# Patient Record
Sex: FEMALE | Race: BLACK OR AFRICAN AMERICAN | NOT HISPANIC OR LATINO | URBAN - METROPOLITAN AREA
[De-identification: names, ages, dates, MRNs, and addresses within clinical notes are randomized per-mention and may not be internally consistent; named-entity substitution may affect disease eponyms.]

---

## 2023-04-20 PROBLEM — R10.9 RIGHT SIDED ABDOMINAL PAIN: Status: ACTIVE | Noted: 2023-04-20

## 2023-04-20 PROBLEM — K58.9 IRRITABLE BOWEL: Status: ACTIVE | Noted: 2023-04-20

## 2023-04-20 PROBLEM — R22.1 NECK FULLNESS: Status: ACTIVE | Noted: 2023-04-20

## 2023-04-20 PROBLEM — R92.30 DENSE BREASTS: Status: ACTIVE | Noted: 2023-04-20

## 2023-04-20 PROBLEM — M25.519 SHOULDER PAIN: Status: ACTIVE | Noted: 2023-04-20

## 2023-04-20 PROBLEM — R19.5 LOOSE STOOLS: Status: ACTIVE | Noted: 2023-04-20

## 2023-04-20 PROBLEM — R92.2 DENSE BREASTS: Status: ACTIVE | Noted: 2023-04-20

## 2023-04-20 PROBLEM — J20.9 ACUTE BRONCHITIS: Status: ACTIVE | Noted: 2023-04-20

## 2023-04-20 PROBLEM — K80.20 CHOLELITHIASIS: Status: ACTIVE | Noted: 2023-04-20

## 2023-04-20 PROBLEM — E78.5 HYPERLIPIDEMIA: Status: ACTIVE | Noted: 2023-04-20

## 2023-04-20 PROBLEM — E04.2 MULTIPLE THYROID NODULES: Status: ACTIVE | Noted: 2023-04-20

## 2023-04-20 PROBLEM — R40.0 DAYTIME SLEEPINESS: Status: ACTIVE | Noted: 2023-04-20

## 2023-04-20 PROBLEM — R91.8 PULMONARY NODULES: Status: ACTIVE | Noted: 2023-04-20

## 2023-04-20 PROBLEM — R10.84 GENERALIZED ABDOMINAL PAIN: Status: ACTIVE | Noted: 2023-04-20

## 2023-04-20 PROBLEM — E11.9 DIABETES MELLITUS, TYPE 2 (MULTI): Status: ACTIVE | Noted: 2023-04-20

## 2023-04-20 PROBLEM — R11.0 MILD NAUSEA: Status: ACTIVE | Noted: 2023-04-20

## 2023-04-20 PROBLEM — N64.4 BREAST PAIN: Status: ACTIVE | Noted: 2023-04-20

## 2023-04-20 PROBLEM — M54.2 NECK PAIN ON RIGHT SIDE: Status: ACTIVE | Noted: 2023-04-20

## 2023-04-20 PROBLEM — E11.40 DIABETIC NEUROPATHY, PAINFUL (MULTI): Status: ACTIVE | Noted: 2023-04-20

## 2023-04-20 PROBLEM — M54.9 RIGHT-SIDED BACK PAIN: Status: ACTIVE | Noted: 2023-04-20

## 2023-04-20 PROBLEM — M19.011 OSTEOARTHRITIS OF SHOULDERS, BILATERAL: Status: ACTIVE | Noted: 2023-04-20

## 2023-04-20 PROBLEM — E55.9 VITAMIN D DEFICIENCY: Status: ACTIVE | Noted: 2023-04-20

## 2023-04-20 PROBLEM — R52 BODY ACHES: Status: ACTIVE | Noted: 2023-04-20

## 2023-04-20 PROBLEM — R53.83 FATIGUE: Status: ACTIVE | Noted: 2023-04-20

## 2023-04-20 PROBLEM — R10.13 EPIGASTRIC PAIN: Status: ACTIVE | Noted: 2023-04-20

## 2023-04-20 PROBLEM — D12.6 ADENOMATOUS COLON POLYP: Status: ACTIVE | Noted: 2023-04-20

## 2023-04-20 PROBLEM — K59.00 CONSTIPATION: Status: ACTIVE | Noted: 2023-04-20

## 2023-04-20 PROBLEM — M54.12 CERVICAL RADICULAR PAIN: Status: ACTIVE | Noted: 2023-04-20
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
## 2023-04-20 PROBLEM — M19.012 OSTEOARTHRITIS OF SHOULDERS, BILATERAL: Status: ACTIVE | Noted: 2023-04-20

## 2023-04-20 RX ORDER — INSULIN LISPRO 100 [IU]/ML
INJECTION, SOLUTION INTRAVENOUS; SUBCUTANEOUS
COMMUNITY
Start: 2021-07-09 | End: 2024-01-25 | Stop reason: SDUPTHER

## 2023-04-20 RX ORDER — HYOSCYAMINE SULFATE 0.12 MG/1
TABLET, ORALLY DISINTEGRATING ORAL
COMMUNITY
Start: 2022-03-28 | End: 2024-02-14 | Stop reason: WASHOUT

## 2023-04-20 RX ORDER — FLASH GLUCOSE SENSOR
KIT MISCELLANEOUS
COMMUNITY
Start: 2023-01-30 | End: 2024-01-25 | Stop reason: SDUPTHER

## 2023-04-20 RX ORDER — DEXTROMETHORPHAN HYDROBROMIDE, GUAIFENESIN 5; 100 MG/5ML; MG/5ML
LIQUID ORAL
COMMUNITY
End: 2024-02-14 | Stop reason: SDUPTHER

## 2023-04-20 RX ORDER — INSULIN DEGLUDEC 100 U/ML
INJECTION, SOLUTION SUBCUTANEOUS
COMMUNITY
Start: 2018-07-31 | End: 2024-01-25 | Stop reason: SDUPTHER

## 2023-04-20 RX ORDER — BLOOD-GLUCOSE METER
KIT MISCELLANEOUS
COMMUNITY
Start: 2018-02-08 | End: 2023-09-14 | Stop reason: ALTCHOICE

## 2023-04-20 RX ORDER — ERGOCALCIFEROL 1.25 MG/1
CAPSULE ORAL
COMMUNITY
Start: 2022-01-31 | End: 2023-04-27

## 2023-04-20 RX ORDER — POLYETHYLENE GLYCOL 3350 17 G/17G
POWDER, FOR SOLUTION ORAL
COMMUNITY
Start: 2021-07-07 | End: 2024-02-14 | Stop reason: WASHOUT

## 2023-04-20 RX ORDER — ATORVASTATIN CALCIUM 40 MG/1
1 TABLET, FILM COATED ORAL NIGHTLY
COMMUNITY
Start: 2021-07-14 | End: 2023-04-27

## 2023-04-20 RX ORDER — LISINOPRIL 5 MG/1
1 TABLET ORAL DAILY
COMMUNITY
Start: 2021-07-14 | End: 2023-04-27

## 2023-04-27 ENCOUNTER — TELEMEDICINE (OUTPATIENT)
Dept: PRIMARY CARE | Facility: CLINIC | Age: 58
End: 2023-04-27
Payer: COMMERCIAL

## 2023-04-27 DIAGNOSIS — R40.0 DAYTIME SLEEPINESS: ICD-10-CM

## 2023-04-27 DIAGNOSIS — M54.12 CERVICAL RADICULAR PAIN: ICD-10-CM

## 2023-04-27 DIAGNOSIS — E11.40 DIABETIC NEUROPATHY, PAINFUL (MULTI): ICD-10-CM

## 2023-04-27 DIAGNOSIS — E11.65 TYPE 2 DIABETES MELLITUS WITH HYPERGLYCEMIA, WITH LONG-TERM CURRENT USE OF INSULIN (MULTI): ICD-10-CM

## 2023-04-27 DIAGNOSIS — R52 BODY ACHES: ICD-10-CM

## 2023-04-27 DIAGNOSIS — R53.83 FATIGUE, UNSPECIFIED TYPE: Primary | ICD-10-CM

## 2023-04-27 DIAGNOSIS — E55.9 VITAMIN D DEFICIENCY: ICD-10-CM

## 2023-04-27 DIAGNOSIS — F43.21 GRIEF: ICD-10-CM

## 2023-04-27 DIAGNOSIS — Z79.4 TYPE 2 DIABETES MELLITUS WITH HYPERGLYCEMIA, WITH LONG-TERM CURRENT USE OF INSULIN (MULTI): ICD-10-CM

## 2023-04-27 PROBLEM — R10.9 RIGHT SIDED ABDOMINAL PAIN: Status: RESOLVED | Noted: 2023-04-20 | Resolved: 2023-04-27

## 2023-04-27 PROBLEM — J20.9 ACUTE BRONCHITIS: Status: RESOLVED | Noted: 2023-04-20 | Resolved: 2023-04-27

## 2023-04-27 PROBLEM — R10.84 GENERALIZED ABDOMINAL PAIN: Status: RESOLVED | Noted: 2023-04-20 | Resolved: 2023-04-27

## 2023-04-27 PROCEDURE — 99442 PR PHYS/QHP TELEPHONE EVALUATION 11-20 MIN: CPT | Performed by: FAMILY MEDICINE

## 2023-04-27 RX ORDER — PEN NEEDLE, DIABETIC 30 GX3/16"
1 NEEDLE, DISPOSABLE MISCELLANEOUS
COMMUNITY
Start: 2017-10-31 | End: 2024-01-25 | Stop reason: ALTCHOICE

## 2023-04-27 RX ORDER — NAPROXEN SODIUM 220 MG
TABLET ORAL
COMMUNITY
Start: 2018-02-08

## 2023-04-27 RX ORDER — BLOOD SUGAR DIAGNOSTIC
1 STRIP MISCELLANEOUS
COMMUNITY
Start: 2016-12-05 | End: 2024-01-25 | Stop reason: ALTCHOICE

## 2023-04-27 RX ORDER — GABAPENTIN 300 MG/1
300 CAPSULE ORAL NIGHTLY
Qty: 90 CAPSULE | Refills: 0 | Status: SHIPPED | OUTPATIENT
Start: 2023-04-27 | End: 2023-09-14 | Stop reason: ALTCHOICE

## 2023-04-27 RX ORDER — INSULIN GLARGINE 100 [IU]/ML
INJECTION, SOLUTION SUBCUTANEOUS
COMMUNITY
Start: 2013-11-06 | End: 2024-01-25 | Stop reason: ALTCHOICE

## 2023-04-27 RX ORDER — LANCETS 33 GAUGE
1 EACH MISCELLANEOUS 2 TIMES DAILY
COMMUNITY
Start: 2018-02-08

## 2023-04-27 RX ORDER — CHOLECALCIFEROL (VITAMIN D3) 50 MCG
50 TABLET ORAL DAILY
Qty: 90 TABLET | Refills: 3 | Status: SHIPPED | OUTPATIENT
Start: 2023-04-27 | End: 2023-04-27

## 2023-04-27 NOTE — PROGRESS NOTES
Subjective   Patient ID: Fay Brandt is a 57 y.o. female who presents for Virtual Visit (Pt is follow-up for DM).    HPI         DM  Followed by endo  States blood sugars have not been at goal.  Her last A1c was elevated to 8.9.  Followed by endocrinology who has adjusted her insulin sliding scale as well as her Tresiba.  Looks like endocrinology wanted her to start Jardiance but she was unable to  from the pharmacy due to requiring prior authorization.  I offered to refill it she declined would like to discuss further with her endocrinologist      Also reports that she has been with increased tiredness and fatigue.  With daytime sleepiness and requiring naps throughout the day.  She does have upcoming appointment with sleep medicine and is requesting sleep study to get done prior to seeing sleep medicine.-Order placed    Also reports diabetic neuropathy as well as multiple body aches including neck pain.  States pain in her feet throughout the day.  Is willing to start something to help with pain.    Vitamin D low in the past.  Started her on 50,000 units weekly.  Did not start over-the-counter vitamin D and requesting vitamin D refill        Review of Systems    All systems reviewed and neg if not noted in the HPI above       Objective   There were no vitals taken for this visit.    Physical Exam    Assessment/Plan   Problem List Items Addressed This Visit          Nervous    Daytime sleepiness    Relevant Orders    In-Center Sleep Study (Non-Sleep Provider Only)    Diabetic neuropathy, painful (CMS/HCC)       Endocrine/Metabolic    Diabetes mellitus, type 2 (CMS/[facility])    Relevant Orders    Hemoglobin A1c    Lipid panel    AST    ALT    Albumin , Urine Random    Vitamin D deficiency    Relevant Medications    cholecalciferol (Vitamin D-3) 50 MCG (2000 UT) tablet       Other    Fatigue - Primary    Relevant Medications    cholecalciferol (Vitamin D-3) 50 MCG (2000 UT) tablet    Other Relevant Orders     In-Center Sleep Study (Non-Sleep Provider Only)     Other Visit Diagnoses       Grief        Relevant Orders    Referral to Psychology

## 2023-04-27 NOTE — PATIENT INSTRUCTIONS
Assessment/Plan   Problem List Items Addressed This Visit          Nervous    Daytime sleepiness    Relevant Orders    In-Center Sleep Study (Non-Sleep Provider Only)       Endocrine/Metabolic    Diabetes mellitus, type 2 (CMS/HCC)  - upcoming appt with endo  - cotdionteue current meds  - would like to discuss the jaurdanine with endo prior to startimg- offered to send over- she declined    Relevant Orders    Hemoglobin A1c    Lipid panel    AST    ALT    Albumin , Urine Random       Other    Fatigue - Primary    Relevant Orders    - upcoming appt with sleep med  - try to get the sleep study prior to this- In-Center Sleep Study (Non-Sleep Provider Only)  - to use vitamin D3 2000iu daily     Other Visit Diagnoses       Grief        Relevant Orders    Referral to Psychology  - you can check out [email or web address] to find your own counselor               Please follow up in 4months for DM/ sleep/ wt check or as needed.       ** If labs or imaging ordered at today's visit, all the non-urgent results will be discussed at your next visit  1  If you have been referred for a special test or to a specialist please call  [phone] to schedule an appointment.  If you have any further questions, or if develop new or worsened symptoms, please give our office a call at [phone].

## 2023-05-02 ENCOUNTER — LAB (OUTPATIENT)
Dept: LAB | Facility: LAB | Age: 58
End: 2023-05-02
Payer: COMMERCIAL

## 2023-05-02 DIAGNOSIS — Z79.4 TYPE 2 DIABETES MELLITUS WITH HYPERGLYCEMIA, WITH LONG-TERM CURRENT USE OF INSULIN (MULTI): ICD-10-CM

## 2023-05-02 DIAGNOSIS — E11.65 TYPE 2 DIABETES MELLITUS WITH HYPERGLYCEMIA, WITH LONG-TERM CURRENT USE OF INSULIN (MULTI): ICD-10-CM

## 2023-05-02 LAB
ESTIMATED AVERAGE GLUCOSE FOR HBA1C: 206 MG/DL
HEMOGLOBIN A1C/HEMOGLOBIN TOTAL IN BLOOD: 8.8 %

## 2023-05-02 PROCEDURE — 84450 TRANSFERASE (AST) (SGOT): CPT

## 2023-05-02 PROCEDURE — 82043 UR ALBUMIN QUANTITATIVE: CPT

## 2023-05-02 PROCEDURE — 80061 LIPID PANEL: CPT

## 2023-05-02 PROCEDURE — 36415 COLL VENOUS BLD VENIPUNCTURE: CPT

## 2023-05-02 PROCEDURE — 83036 HEMOGLOBIN GLYCOSYLATED A1C: CPT

## 2023-05-02 PROCEDURE — 84460 ALANINE AMINO (ALT) (SGPT): CPT

## 2023-05-02 PROCEDURE — 82570 ASSAY OF URINE CREATININE: CPT

## 2023-05-03 LAB
ALANINE AMINOTRANSFERASE (SGPT) (U/L) IN SER/PLAS: 14 U/L (ref 7–45)
ALBUMIN (MG/L) IN URINE: 159.4 MG/L
ALBUMIN/CREATININE (UG/MG) IN URINE: 24.6 UG/MG CRT (ref 0–30)
ASPARTATE AMINOTRANSFERASE (SGOT) (U/L) IN SER/PLAS: 13 U/L (ref 9–39)
CHOLESTEROL (MG/DL) IN SER/PLAS: 233 MG/DL (ref 0–199)
CHOLESTEROL IN HDL (MG/DL) IN SER/PLAS: 44.4 MG/DL
CHOLESTEROL/HDL RATIO: 5.2
CREATININE (MG/DL) IN URINE: 649 MG/DL (ref 20–320)
LDL: 142 MG/DL (ref 0–99)
NON HDL CHOLESTEROL: 189 MG/DL
TRIGLYCERIDE (MG/DL) IN SER/PLAS: 235 MG/DL (ref 0–149)
VLDL: 47 MG/DL (ref 0–40)

## 2023-09-06 ENCOUNTER — TELEPHONE (OUTPATIENT)
Dept: PRIMARY CARE | Facility: CLINIC | Age: 58
End: 2023-09-06

## 2023-09-06 NOTE — TELEPHONE ENCOUNTER
Patient has concerns with the following symptoms:  Fatigue   No energy   48 hour headache    Is patient ok for sick/7:40?

## 2023-09-14 ENCOUNTER — TELEMEDICINE (OUTPATIENT)
Dept: PRIMARY CARE | Facility: CLINIC | Age: 58
End: 2023-09-14
Payer: COMMERCIAL

## 2023-09-14 DIAGNOSIS — E11.40 DIABETIC NEUROPATHY, PAINFUL (MULTI): ICD-10-CM

## 2023-09-14 DIAGNOSIS — Z79.4 TYPE 2 DIABETES MELLITUS WITH HYPERGLYCEMIA, WITH LONG-TERM CURRENT USE OF INSULIN (MULTI): Primary | ICD-10-CM

## 2023-09-14 DIAGNOSIS — R52 BODY ACHES: ICD-10-CM

## 2023-09-14 DIAGNOSIS — E11.65 TYPE 2 DIABETES MELLITUS WITH HYPERGLYCEMIA, WITH LONG-TERM CURRENT USE OF INSULIN (MULTI): Primary | ICD-10-CM

## 2023-09-14 DIAGNOSIS — E78.5 HYPERLIPIDEMIA, UNSPECIFIED HYPERLIPIDEMIA TYPE: ICD-10-CM

## 2023-09-14 DIAGNOSIS — R40.0 DAYTIME SLEEPINESS: ICD-10-CM

## 2023-09-14 DIAGNOSIS — R53.83 FATIGUE, UNSPECIFIED TYPE: ICD-10-CM

## 2023-09-14 PROBLEM — M25.50 MULTIPLE JOINT PAIN: Status: ACTIVE | Noted: 2023-09-14

## 2023-09-14 PROCEDURE — 99442 PR PHYS/QHP TELEPHONE EVALUATION 11-20 MIN: CPT | Performed by: FAMILY MEDICINE

## 2023-09-14 RX ORDER — ERGOCALCIFEROL 1.25 MG/1
CAPSULE ORAL
COMMUNITY
Start: 2022-01-31 | End: 2024-01-25 | Stop reason: ALTCHOICE

## 2023-09-14 RX ORDER — ATORVASTATIN CALCIUM 40 MG/1
40 TABLET, FILM COATED ORAL DAILY
COMMUNITY
End: 2024-01-25 | Stop reason: ALTCHOICE

## 2023-09-14 RX ORDER — LANCETS 28 GAUGE
EACH MISCELLANEOUS
COMMUNITY
Start: 2023-06-03

## 2023-09-14 RX ORDER — SITAGLIPTIN 100 MG/1
100 TABLET, FILM COATED ORAL DAILY
COMMUNITY
End: 2024-01-25 | Stop reason: ALTCHOICE

## 2023-09-14 RX ORDER — EMPAGLIFLOZIN 10 MG/1
10 TABLET, FILM COATED ORAL DAILY
COMMUNITY
End: 2023-09-14 | Stop reason: ALTCHOICE

## 2023-09-14 RX ORDER — LISINOPRIL 5 MG/1
5 TABLET ORAL DAILY
COMMUNITY
End: 2024-01-25 | Stop reason: SDUPTHER

## 2023-09-14 RX ORDER — GABAPENTIN 300 MG/1
300 CAPSULE ORAL NIGHTLY
Qty: 90 CAPSULE | Refills: 0 | Status: SHIPPED | OUTPATIENT
Start: 2023-09-14 | End: 2024-04-17

## 2023-09-14 RX ORDER — PEN NEEDLE, DIABETIC 31 GX5/16"
NEEDLE, DISPOSABLE MISCELLANEOUS
COMMUNITY
Start: 2023-05-04 | End: 2024-01-25 | Stop reason: ALTCHOICE

## 2023-09-14 NOTE — PROGRESS NOTES
"Subjective   Patient ID: Fay Brandt is a 58 y.o. female who presents for Follow-up (Pt is following up for fatigue and headache.).    HPI     Fatigue and headache  Has been with increased tiredness and fatigue.  With daytime sleepiness and requiring naps throughout the day.  She does have upcoming appointment with sleep medicine and sleep study was already ordered- buy had to reschedule and appt. is now pushed out to Nov and Dec      Has been pain in the legs- neuropathy due to DM  Was on cris- unsure why she stopped- but no longer using and unsure if it helped    DM  Not checking BS  Followed by endo- past due for follow up  Last hba1c 8.8  Using her meds as directed      Review of Systems  All systems reviewed and neg if not noted in the HPI above   Objective   There were no vitals taken for this visit.    Physical Exam  No PE  Assessment/Plan   Problem List Items Addressed This Visit       Body aches    Relevant Medications    gabapentin (Neurontin) 300 mg capsule    Daytime sleepiness  - To call to see if you can get a sooner appt with sleep medicine and sleep study    Diabetes mellitus, type 2 (CMS/HCC) - Primary    - relayed the importance of checking BS daily with freestyle fredy  - increased jardiance from 10 to 25  - to call endo for follow up  - ordered labs  Relevant Medications    FreeStyle Lancets 28 gauge    BD Ultra-Fine Mini Pen Needle 31 gauge x 3/16\" needle    lisinopril 5 mg tablet    Januvia 100 mg tablet    empagliflozin (Jardiance) 25 mg    Other Relevant Orders    Lipid Panel Non-Fasting    Cholesterol, LDL Direct    Hemoglobin A1C    Diabetic neuropathy, painful (CMS/HCC)     Has been with pain- not using cris  Refilled to restart         Relevant Medications    gabapentin (Neurontin) 300 mg capsule    empagliflozin (Jardiance) 25 mg    Other Relevant Orders    Lipid Panel Non-Fasting    Cholesterol, LDL Direct    Hemoglobin A1C    Fatigue    Hyperlipidemia    Relevant Medications    " atorvastatin (Lipitor) 40 mg tablet        Please follow up in 3-4 months DM/Fatigue/sleep or as needed.

## 2023-09-14 NOTE — PATIENT INSTRUCTIONS
DM  - relayed the importance of checking BS daily with freestyle fredy  - increased jardiance from 10 to 25  - to call endo for follow up  - ordered labs    For leg pain with DM= neuropathy  - Restart the Kelton 300mg at night    Fatigue with daytime sleepiness  - To call to see if you can get a sooner appt with sleep medicine and sleep study    Please follow up in 3-4 months DM/Fatigue/sleep or as needed.       ** If labs or imaging ordered at today's visit, all the non-urgent results will be discussed at your next visit    If you have been referred for a special test or to a specialist please call  [phone] to schedule an appointment.  If you have any further questions, or if develop new or worsened symptoms, please give our office a call at [phone].

## 2023-09-15 ENCOUNTER — APPOINTMENT (OUTPATIENT)
Dept: PRIMARY CARE | Facility: CLINIC | Age: 58
End: 2023-09-15
Payer: COMMERCIAL

## 2023-09-27 ENCOUNTER — LAB (OUTPATIENT)
Dept: LAB | Facility: LAB | Age: 58
End: 2023-09-27
Payer: COMMERCIAL

## 2023-09-27 DIAGNOSIS — E11.65 TYPE 2 DIABETES MELLITUS WITH HYPERGLYCEMIA, WITH LONG-TERM CURRENT USE OF INSULIN (MULTI): ICD-10-CM

## 2023-09-27 DIAGNOSIS — E11.40 DIABETIC NEUROPATHY, PAINFUL (MULTI): ICD-10-CM

## 2023-09-27 DIAGNOSIS — Z79.4 TYPE 2 DIABETES MELLITUS WITH HYPERGLYCEMIA, WITH LONG-TERM CURRENT USE OF INSULIN (MULTI): ICD-10-CM

## 2023-09-27 LAB
CHOLESTEROL (MG/DL) IN SER/PLAS: 223 MG/DL (ref 0–199)
CHOLESTEROL IN HDL (MG/DL) IN SER/PLAS: 43.1 MG/DL
CHOLESTEROL IN LDL (MG/DL) IN SER/PLAS BY DIRECT ASSAY: 161 MG/DL (ref 0–129)
CHOLESTEROL/HDL RATIO: 5.2
ESTIMATED AVERAGE GLUCOSE FOR HBA1C: 206 MG/DL
HEMOGLOBIN A1C/HEMOGLOBIN TOTAL IN BLOOD: 8.8 %
NON-HDL CHOLESTEROL: 180 MG/DL

## 2023-09-27 PROCEDURE — 83721 ASSAY OF BLOOD LIPOPROTEIN: CPT

## 2023-09-27 PROCEDURE — 83718 ASSAY OF LIPOPROTEIN: CPT

## 2023-09-27 PROCEDURE — 83036 HEMOGLOBIN GLYCOSYLATED A1C: CPT

## 2023-09-27 PROCEDURE — 36415 COLL VENOUS BLD VENIPUNCTURE: CPT

## 2023-09-27 PROCEDURE — 82465 ASSAY BLD/SERUM CHOLESTEROL: CPT

## 2023-11-06 RX ORDER — BLOOD-GLUCOSE METER
KIT MISCELLANEOUS
COMMUNITY
End: 2024-01-25 | Stop reason: ALTCHOICE

## 2023-11-08 ENCOUNTER — APPOINTMENT (OUTPATIENT)
Dept: SLEEP MEDICINE | Facility: CLINIC | Age: 58
End: 2023-11-08
Payer: COMMERCIAL

## 2024-01-25 ENCOUNTER — OFFICE VISIT (OUTPATIENT)
Dept: ENDOCRINOLOGY | Facility: CLINIC | Age: 59
End: 2024-01-25
Payer: COMMERCIAL

## 2024-01-25 VITALS
HEART RATE: 75 BPM | HEIGHT: 66 IN | BODY MASS INDEX: 32.53 KG/M2 | WEIGHT: 202.4 LBS | SYSTOLIC BLOOD PRESSURE: 149 MMHG | DIASTOLIC BLOOD PRESSURE: 88 MMHG | TEMPERATURE: 97.2 F

## 2024-01-25 DIAGNOSIS — Z79.4 TYPE 2 DIABETES MELLITUS WITH HYPERGLYCEMIA, WITH LONG-TERM CURRENT USE OF INSULIN (MULTI): ICD-10-CM

## 2024-01-25 DIAGNOSIS — E11.65 TYPE 2 DIABETES MELLITUS WITH HYPERGLYCEMIA, WITH LONG-TERM CURRENT USE OF INSULIN (MULTI): Primary | ICD-10-CM

## 2024-01-25 DIAGNOSIS — E55.9 VITAMIN D DEFICIENCY: ICD-10-CM

## 2024-01-25 DIAGNOSIS — E11.65 TYPE 2 DIABETES MELLITUS WITH HYPERGLYCEMIA, WITH LONG-TERM CURRENT USE OF INSULIN (MULTI): ICD-10-CM

## 2024-01-25 DIAGNOSIS — R53.83 FATIGUE, UNSPECIFIED TYPE: ICD-10-CM

## 2024-01-25 DIAGNOSIS — Z79.4 TYPE 2 DIABETES MELLITUS WITH HYPERGLYCEMIA, WITH LONG-TERM CURRENT USE OF INSULIN (MULTI): Primary | ICD-10-CM

## 2024-01-25 DIAGNOSIS — E11.40 DIABETIC NEUROPATHY, PAINFUL (MULTI): ICD-10-CM

## 2024-01-25 DIAGNOSIS — E78.5 HYPERLIPIDEMIA, UNSPECIFIED HYPERLIPIDEMIA TYPE: ICD-10-CM

## 2024-01-25 PROCEDURE — 4010F ACE/ARB THERAPY RXD/TAKEN: CPT | Performed by: STUDENT IN AN ORGANIZED HEALTH CARE EDUCATION/TRAINING PROGRAM

## 2024-01-25 PROCEDURE — 3079F DIAST BP 80-89 MM HG: CPT | Performed by: STUDENT IN AN ORGANIZED HEALTH CARE EDUCATION/TRAINING PROGRAM

## 2024-01-25 PROCEDURE — 95251 CONT GLUC MNTR ANALYSIS I&R: CPT | Performed by: STUDENT IN AN ORGANIZED HEALTH CARE EDUCATION/TRAINING PROGRAM

## 2024-01-25 PROCEDURE — 3077F SYST BP >= 140 MM HG: CPT | Performed by: STUDENT IN AN ORGANIZED HEALTH CARE EDUCATION/TRAINING PROGRAM

## 2024-01-25 PROCEDURE — 99215 OFFICE O/P EST HI 40 MIN: CPT | Performed by: STUDENT IN AN ORGANIZED HEALTH CARE EDUCATION/TRAINING PROGRAM

## 2024-01-25 RX ORDER — ATORVASTATIN CALCIUM 80 MG/1
80 TABLET, FILM COATED ORAL DAILY
Qty: 90 TABLET | Refills: 3 | Status: SHIPPED | OUTPATIENT
Start: 2024-01-25 | End: 2025-01-24

## 2024-01-25 RX ORDER — FLASH GLUCOSE SCANNING READER
EACH MISCELLANEOUS
Qty: 1 EACH | Refills: 0 | Status: SHIPPED | OUTPATIENT
Start: 2024-01-25

## 2024-01-25 RX ORDER — INSULIN LISPRO 100 [IU]/ML
INJECTION, SOLUTION INTRAVENOUS; SUBCUTANEOUS
Qty: 30 ML | Refills: 3 | Status: SHIPPED | OUTPATIENT
Start: 2024-01-25 | End: 2024-03-12 | Stop reason: SDUPTHER

## 2024-01-25 RX ORDER — CHOLECALCIFEROL (VITAMIN D3) 50 MCG
TABLET ORAL
Qty: 90 TABLET | Refills: 3 | Status: SHIPPED | OUTPATIENT
Start: 2024-01-25 | End: 2024-02-14 | Stop reason: SDUPTHER

## 2024-01-25 RX ORDER — IBUPROFEN 200 MG
CAPSULE ORAL
Qty: 100 STRIP | Refills: 5 | Status: SHIPPED | OUTPATIENT
Start: 2024-01-25 | End: 2024-06-07 | Stop reason: WASHOUT

## 2024-01-25 RX ORDER — FLASH GLUCOSE SENSOR
KIT MISCELLANEOUS
Qty: 2 EACH | Refills: 11 | Status: SHIPPED | OUTPATIENT
Start: 2024-01-25

## 2024-01-25 RX ORDER — BLOOD-GLUCOSE METER
KIT MISCELLANEOUS
Qty: 100 STRIP | Refills: 2 | Status: SHIPPED | OUTPATIENT
Start: 2024-01-25 | End: 2024-01-25 | Stop reason: ALTCHOICE

## 2024-01-25 RX ORDER — DEXTROSE 4 G
TABLET,CHEWABLE ORAL
Qty: 1 EACH | Refills: 0 | Status: SHIPPED | OUTPATIENT
Start: 2024-01-25 | End: 2024-06-07 | Stop reason: WASHOUT

## 2024-01-25 RX ORDER — INSULIN DEGLUDEC 100 U/ML
INJECTION, SOLUTION SUBCUTANEOUS
Qty: 15 ML | Refills: 3 | Status: SHIPPED | OUTPATIENT
Start: 2024-01-25 | End: 2024-03-12 | Stop reason: SDUPTHER

## 2024-01-25 RX ORDER — LISINOPRIL 5 MG/1
5 TABLET ORAL DAILY
Qty: 90 TABLET | Refills: 3 | Status: SHIPPED | OUTPATIENT
Start: 2024-01-25

## 2024-01-25 RX ORDER — PEN NEEDLE, DIABETIC 31 GX5/16"
NEEDLE, DISPOSABLE MISCELLANEOUS
Qty: 200 EACH | Refills: 3 | Status: SHIPPED | OUTPATIENT
Start: 2024-01-25

## 2024-01-25 NOTE — PROGRESS NOTES
"58F PMH: T2DM and HLD coming in for management of Diabetes.       Diabetes History  DM diagnosed >5 yrs   Complications Micro and Macro-neuropathy, albuminuria   Last A1c 8.8 in 9/2023    Regimen   Tresiba 55  humalog 18-18-18 with ISS 2:50 >150.  Clare has been doing 20 units with lunch  Januvia -not taking      Jardiance 25mg daily-not taking     Intolerant to: GLP1, metformin     SMBG  Claudia 2    Hypoglycemia none      Diet:eating out less, does not eat breakfast or lunch consistently   snacks a lot   dinner is full meal   Late night snack and drinking sweet tea      Comorbidities and Screening  Eye Exam-needs   Foot exam-needs    atorvastatin 40  Cr and albuminuria- + albuminuria   ACE/ARB-lisinopril 5mg        12 Point ROS reviewed and is negative except for pertinent findings noted on HPI     Physical Exam  Constitutional:       Appearance: Normal appearance.   Cardiovascular:      Pulses:           Dorsalis pedis pulses are 2+ on the right side and 2+ on the left side.        Posterior tibial pulses are 2+ on the right side and 2+ on the left side.   Abdominal:      Comments: Abdominal obesity   Feet:      Right foot:      Protective Sensation: 6 sites tested.  4 sites sensed.      Skin integrity: Skin integrity normal.      Left foot:      Protective Sensation: 6 sites tested.  4 sites sensed.      Skin integrity: Skin integrity normal.   Skin:     Comments: No lipodystrophy   Neurological:      Mental Status: She is alert.   Psychiatric:         Mood and Affect: Mood normal.         Behavior: Behavior normal.           labs and imaging reviewed, pertinent findings listed on HPI and Impression    Problem List Items Addressed This Visit       Diabetes mellitus, type 2 (CMS/HCC)    Relevant Medications    FreeStyle Lite Strips strip    FreeStyle Claudia 2 Sensor kit    FreeStyle Claudia reader (FreeStyle Claudia 2 [place]) misc    BD Ultra-Fine Mini Pen Needle 31 gauge x 3/16\" needle    insulin degludec " (Tresiba FlexTouch) 100 unit/mL (3 mL) injection    insulin lispro (HumaLOG) 100 unit/mL injection    lisinopril 5 mg tablet    atorvastatin (Lipitor) 80 mg tablet    empagliflozin (Jardiance) 10 mg    Other Relevant Orders    Albumin , Urine Random    Hemoglobin A1C    Lipid Panel    Renal Function Panel    Referral to Ophthalmology    Diabetic neuropathy, painful (CMS/HCC)    Fatigue    Relevant Medications    cholecalciferol (Vitamin D-3) 50 MCG (2000 UT) tablet    Hyperlipidemia    Vitamin D deficiency    Relevant Medications    cholecalciferol (Vitamin D-3) 50 MCG (2000 UT) tablet     Dm2 with compliatoins: neuropathy and albuminuria     CGM reviewed, minimum of 72 hrs of data reviewed, CGM data reviewed to influence glucose treatment plan   Target 50% in range with highs later at night and overnight, related to late night snacking and doing sugary drinks.  Average     Tresiba 65  Prandial 18-18-18 with sliding scale 2:50>150   10 units with no sliding scale with bedtime snack   Restart jardiance 10mg daily  Stop januvia-she doesn't like to take pills   Increase atorvastatin to 80mg daily     Foot exam done today   Sent of ophtha referral     Labs prior to next visit, follow up in 3-4 months

## 2024-01-25 NOTE — PATIENT INSTRUCTIONS
-stop januvia  -for cholesterol: atorvastatin increase 80mg daily  -for kidney and blood pressure-continue lisinopril  -for kidney and diabetes-[id number] daily     Continue the insulin instructions on your sheet    Follow up in 3 months, with labs beforehand fasting from midnight     Go online to [employer] website andsee if your eligible for coupon    Follow up 3-4    Elisabet Prinec MD  Divison of Endocrinology   [facility]   Phone: [phone]    option 4, then option 1  Fax: [phone]

## 2024-02-13 PROBLEM — F43.21 GRIEF: Status: ACTIVE | Noted: 2024-02-13

## 2024-02-13 PROBLEM — E66.9 OBESITY DUE TO ENERGY IMBALANCE: Status: ACTIVE | Noted: 2024-02-13

## 2024-02-13 PROBLEM — Z86.39 HISTORY OF DIABETES MELLITUS: Status: ACTIVE | Noted: 2024-02-13

## 2024-02-14 ENCOUNTER — OFFICE VISIT (OUTPATIENT)
Dept: PRIMARY CARE | Facility: CLINIC | Age: 59
End: 2024-02-14
Payer: COMMERCIAL

## 2024-02-14 ENCOUNTER — LAB (OUTPATIENT)
Dept: LAB | Facility: LAB | Age: 59
End: 2024-02-14
Payer: COMMERCIAL

## 2024-02-14 VITALS
DIASTOLIC BLOOD PRESSURE: 60 MMHG | HEART RATE: 98 BPM | BODY MASS INDEX: 32.4 KG/M2 | TEMPERATURE: 95.8 F | RESPIRATION RATE: 14 BRPM | OXYGEN SATURATION: 99 % | WEIGHT: 201.6 LBS | HEIGHT: 66 IN | SYSTOLIC BLOOD PRESSURE: 120 MMHG

## 2024-02-14 DIAGNOSIS — R53.83 FATIGUE, UNSPECIFIED TYPE: ICD-10-CM

## 2024-02-14 DIAGNOSIS — R06.09 DOE (DYSPNEA ON EXERTION): ICD-10-CM

## 2024-02-14 DIAGNOSIS — R06.09 DOE (DYSPNEA ON EXERTION): Primary | ICD-10-CM

## 2024-02-14 DIAGNOSIS — M25.50 MULTIPLE JOINT PAIN: ICD-10-CM

## 2024-02-14 DIAGNOSIS — E11.9 TYPE 2 DIABETES MELLITUS WITHOUT COMPLICATION, UNSPECIFIED WHETHER LONG TERM INSULIN USE (MULTI): ICD-10-CM

## 2024-02-14 DIAGNOSIS — R40.0 DAYTIME SLEEPINESS: ICD-10-CM

## 2024-02-14 DIAGNOSIS — E55.9 VITAMIN D DEFICIENCY: ICD-10-CM

## 2024-02-14 LAB
25(OH)D3 SERPL-MCNC: 27 NG/ML (ref 30–100)
ALBUMIN SERPL BCP-MCNC: 4.5 G/DL (ref 3.4–5)
ALP SERPL-CCNC: 96 U/L (ref 33–110)
ALT SERPL W P-5'-P-CCNC: 15 U/L (ref 7–45)
ANION GAP SERPL CALC-SCNC: 14 MMOL/L (ref 10–20)
AST SERPL W P-5'-P-CCNC: 12 U/L (ref 9–39)
BASOPHILS # BLD AUTO: 0.04 X10*3/UL (ref 0–0.1)
BASOPHILS NFR BLD AUTO: 0.5 %
BILIRUB SERPL-MCNC: 0.5 MG/DL (ref 0–1.2)
BUN SERPL-MCNC: 11 MG/DL (ref 6–23)
CALCIUM SERPL-MCNC: 10 MG/DL (ref 8.6–10.6)
CHLORIDE SERPL-SCNC: 100 MMOL/L (ref 98–107)
CHOLEST SERPL-MCNC: 268 MG/DL (ref 0–199)
CHOLESTEROL/HDL RATIO: 5.8
CO2 SERPL-SCNC: 28 MMOL/L (ref 21–32)
CREAT SERPL-MCNC: 0.86 MG/DL (ref 0.5–1.05)
CRP SERPL-MCNC: 0.32 MG/DL
EGFRCR SERPLBLD CKD-EPI 2021: 78 ML/MIN/1.73M*2
EOSINOPHIL # BLD AUTO: 0.1 X10*3/UL (ref 0–0.7)
EOSINOPHIL NFR BLD AUTO: 1.4 %
ERYTHROCYTE [DISTWIDTH] IN BLOOD BY AUTOMATED COUNT: 11.9 % (ref 11.5–14.5)
ERYTHROCYTE [SEDIMENTATION RATE] IN BLOOD BY WESTERGREN METHOD: 12 MM/H (ref 0–30)
GLUCOSE SERPL-MCNC: 296 MG/DL (ref 74–99)
HCT VFR BLD AUTO: 39.5 % (ref 36–46)
HDLC SERPL-MCNC: 45.9 MG/DL
HGB BLD-MCNC: 13.8 G/DL (ref 12–16)
IMM GRANULOCYTES # BLD AUTO: 0.02 X10*3/UL (ref 0–0.7)
IMM GRANULOCYTES NFR BLD AUTO: 0.3 % (ref 0–0.9)
LDLC SERPL CALC-MCNC: 192 MG/DL
LYMPHOCYTES # BLD AUTO: 2.26 X10*3/UL (ref 1.2–4.8)
LYMPHOCYTES NFR BLD AUTO: 30.6 %
MCH RBC QN AUTO: 29.9 PG (ref 26–34)
MCHC RBC AUTO-ENTMCNC: 34.9 G/DL (ref 32–36)
MCV RBC AUTO: 86 FL (ref 80–100)
MONOCYTES # BLD AUTO: 0.33 X10*3/UL (ref 0.1–1)
MONOCYTES NFR BLD AUTO: 4.5 %
NEUTROPHILS # BLD AUTO: 4.64 X10*3/UL (ref 1.2–7.7)
NEUTROPHILS NFR BLD AUTO: 62.7 %
NON HDL CHOLESTEROL: 222 MG/DL (ref 0–149)
NRBC BLD-RTO: 0 /100 WBCS (ref 0–0)
PLATELET # BLD AUTO: 299 X10*3/UL (ref 150–450)
POTASSIUM SERPL-SCNC: 4.4 MMOL/L (ref 3.5–5.3)
PROT SERPL-MCNC: 7.4 G/DL (ref 6.4–8.2)
RBC # BLD AUTO: 4.61 X10*6/UL (ref 4–5.2)
RHEUMATOID FACT SER NEPH-ACNC: <10 IU/ML (ref 0–15)
SODIUM SERPL-SCNC: 138 MMOL/L (ref 136–145)
TRIGL SERPL-MCNC: 153 MG/DL (ref 0–149)
TSH SERPL-ACNC: 2.52 MIU/L (ref 0.44–3.98)
URATE SERPL-MCNC: 4.9 MG/DL (ref 2.3–6.7)
VIT B12 SERPL-MCNC: 495 PG/ML (ref 211–911)
VLDL: 31 MG/DL (ref 0–40)
WBC # BLD AUTO: 7.4 X10*3/UL (ref 4.4–11.3)

## 2024-02-14 PROCEDURE — 80053 COMPREHEN METABOLIC PANEL: CPT

## 2024-02-14 PROCEDURE — 86038 ANTINUCLEAR ANTIBODIES: CPT

## 2024-02-14 PROCEDURE — 83036 HEMOGLOBIN GLYCOSYLATED A1C: CPT

## 2024-02-14 PROCEDURE — 86235 NUCLEAR ANTIGEN ANTIBODY: CPT

## 2024-02-14 PROCEDURE — 82306 VITAMIN D 25 HYDROXY: CPT

## 2024-02-14 PROCEDURE — 86431 RHEUMATOID FACTOR QUANT: CPT

## 2024-02-14 PROCEDURE — 80061 LIPID PANEL: CPT

## 2024-02-14 PROCEDURE — 3051F HG A1C>EQUAL 7.0%<8.0%: CPT | Performed by: FAMILY MEDICINE

## 2024-02-14 PROCEDURE — 84443 ASSAY THYROID STIM HORMONE: CPT

## 2024-02-14 PROCEDURE — 36415 COLL VENOUS BLD VENIPUNCTURE: CPT

## 2024-02-14 PROCEDURE — 99214 OFFICE O/P EST MOD 30 MIN: CPT | Performed by: FAMILY MEDICINE

## 2024-02-14 PROCEDURE — 86200 CCP ANTIBODY: CPT

## 2024-02-14 PROCEDURE — 86225 DNA ANTIBODY NATIVE: CPT

## 2024-02-14 PROCEDURE — 82607 VITAMIN B-12: CPT

## 2024-02-14 PROCEDURE — 1036F TOBACCO NON-USER: CPT | Performed by: FAMILY MEDICINE

## 2024-02-14 PROCEDURE — 84550 ASSAY OF BLOOD/URIC ACID: CPT

## 2024-02-14 PROCEDURE — 3074F SYST BP LT 130 MM HG: CPT | Performed by: FAMILY MEDICINE

## 2024-02-14 PROCEDURE — 3078F DIAST BP <80 MM HG: CPT | Performed by: FAMILY MEDICINE

## 2024-02-14 PROCEDURE — 85652 RBC SED RATE AUTOMATED: CPT

## 2024-02-14 PROCEDURE — 86140 C-REACTIVE PROTEIN: CPT

## 2024-02-14 PROCEDURE — 93000 ELECTROCARDIOGRAM COMPLETE: CPT | Performed by: FAMILY MEDICINE

## 2024-02-14 PROCEDURE — 3050F LDL-C >= 130 MG/DL: CPT | Performed by: FAMILY MEDICINE

## 2024-02-14 PROCEDURE — 85025 COMPLETE CBC W/AUTO DIFF WBC: CPT

## 2024-02-14 PROCEDURE — 4010F ACE/ARB THERAPY RXD/TAKEN: CPT | Performed by: FAMILY MEDICINE

## 2024-02-14 RX ORDER — DEXTROMETHORPHAN HYDROBROMIDE, GUAIFENESIN 5; 100 MG/5ML; MG/5ML
1300 LIQUID ORAL EVERY 8 HOURS PRN
Qty: 90 TABLET | Refills: 0 | Status: SHIPPED | OUTPATIENT
Start: 2024-02-14 | End: 2024-04-23 | Stop reason: WASHOUT

## 2024-02-14 RX ORDER — CHOLECALCIFEROL (VITAMIN D3) 50 MCG
TABLET ORAL
Qty: 90 TABLET | Refills: 3 | Status: SHIPPED | OUTPATIENT
Start: 2024-02-14 | End: 2025-02-13

## 2024-02-14 RX ORDER — SITAGLIPTIN 100 MG/1
TABLET, FILM COATED ORAL
COMMUNITY
Start: 2024-02-07 | End: 2024-02-14 | Stop reason: WASHOUT

## 2024-02-14 ASSESSMENT — PATIENT HEALTH QUESTIONNAIRE - PHQ9
2. FEELING DOWN, DEPRESSED OR HOPELESS: NOT AT ALL
SUM OF ALL RESPONSES TO PHQ9 QUESTIONS 1 AND 2: 0
1. LITTLE INTEREST OR PLEASURE IN DOING THINGS: NOT AT ALL

## 2024-02-14 NOTE — PROGRESS NOTES
"Subjective   Patient ID: Fay Brandt is a 58 y.o. female who presents for Follow-up (Follow up-Extreme Fatigue).    HPI   She states her fatigue as been extreme  C/o about this \"forever\"  Feeling worse however  Fatigue- needing a nap as well body aches/ weakness, sometimes with poor balance.  With BURGESS  No fever or chills  No night sweats'  No depression/ anxiety  Denies CP, SOB, palpitations, change in vision, dizziness, N/V      Review of Systems  All systems reviewed and neg if not noted in the HPI above   Objective   /60 (Patient Position: Sitting)   Pulse 98   Temp 35.4 °C (95.8 °F)   Resp 14   Ht 1.676 m (5' 6\")   Wt 91.4 kg (201 lb 9.6 oz)   SpO2 99%   BMI 32.54 kg/m²     Physical Exam  Pleasant  Eyes: conjunctiva non-icteric and eye lids are without obvious rash or drooping. Pupils are symmetric.   Ears, Nose, Mouth, and Throat: External ears and nose appear to be without deformity or rash. No lesions or masses noted. Hearing is grossly intact.   CV: RRR, no murmur  Carotids: no bruits  Pulm:CTA B/L  Abd: soft, NTTP, + BS  LE: no edema  Psychiatric: Alert, orientation to person, place, and time. Recent/remote memory as evidenced through face-to-face interaction and discussion appear grossly intact. Mood and affect are normal.    Assessment/Plan   Problem List Items Addressed This Visit             ICD-10-CM    Daytime sleepiness R40.0    Diabetes mellitus, type 2 (CMS/[facility]) E11.9    Relevant Orders    Hemoglobin A1C    Lipid Panel    Fatigue R53.83    Relevant Medications    cholecalciferol (Vitamin D-3) 50 MCG (2000 UT) tablet    Other Relevant Orders    CBC and Auto Differential    Comprehensive Metabolic Panel    Hemoglobin A1C    Lipid Panel    TSH with reflex to Free T4 if abnormal    Vitamin B12    Vitamin D 25-Hydroxy,Total (for eval of Vitamin D levels)    ECG 12 lead (Clinic Performed) (Completed)    XR chest 2 views    QUANG with Reflex to AMPARO    Arthritis Panel (CMS)    Anti-DNA " antibody, double-stranded    C-reactive protein    Cyclic citrul peptide antibody, IgG    Rheumatoid factor    Sedimentation rate, automated    Vitamin D deficiency E55.9    Relevant Medications    cholecalciferol (Vitamin D-3) 50 MCG (2000 UT) tablet    Other Relevant Orders    Vitamin D 25-Hydroxy,Total (for eval of Vitamin D levels)    Multiple joint pain M25.50    Relevant Medications    acetaminophen (Tylenol 8 HOUR) 650 mg ER tablet    Other Relevant Orders    CBC and Auto Differential    Comprehensive Metabolic Panel    Hemoglobin A1C    Lipid Panel    TSH with reflex to Free T4 if abnormal    Vitamin B12    Vitamin D 25-Hydroxy,Total (for eval of Vitamin D levels)    QUANG with Reflex to AMPARO    Arthritis Panel (CMS)    Anti-DNA antibody, double-stranded    C-reactive protein    Cyclic citrul peptide antibody, IgG    Rheumatoid factor    Sedimentation rate, automated    Uric acid     Other Visit Diagnoses         Codes    BURGESS (dyspnea on exertion)    -  Primary R06.09    Relevant Orders    CBC and Auto Differential    Comprehensive Metabolic Panel    TSH with reflex to Free T4 if abnormal    ECG 12 lead (Clinic Performed) (Completed)    XR chest 2 views    Rheumatoid factor    Sedimentation rate, automated               Please follow up in 2months (ok for SS) for fatigue or as needed.

## 2024-02-14 NOTE — PATIENT INSTRUCTIONS
Labs today  Upcoming appt with sleep team  EKG was nml      Please follow up in 2months (ok for SS) for fatigue or as needed.       ** If labs or imaging ordered at today's visit, all the non-urgent results will be discussed at your next visit    If you have been referred for a special test or to a specialist please call  [phone] to schedule an appointment.  If you have any further questions, or if develop new or worsened symptoms, please give our office a call at [phone].

## 2024-02-15 LAB
ANA PATTERN: ABNORMAL
ANA SER QL HEP2 SUBST: POSITIVE
ANA TITR SER IF: ABNORMAL {TITER}
CCP IGG SERPL-ACNC: <1 U/ML
CENTROMERE B AB SER-ACNC: <0.2 AI
CHROMATIN AB SERPL-ACNC: <0.2 AI
DSDNA AB SER-ACNC: <1 IU/ML
DSDNA AB SER-ACNC: <1 IU/ML
ENA JO1 AB SER QL IA: <0.2 AI
ENA RNP AB SER IA-ACNC: <0.2 AI
ENA SCL70 AB SER QL IA: <0.2 AI
ENA SM AB SER IA-ACNC: <0.2 AI
ENA SM+RNP AB SER QL IA: <0.2 AI
ENA SS-A AB SER IA-ACNC: <0.2 AI
ENA SS-B AB SER IA-ACNC: <0.2 AI
EST. AVERAGE GLUCOSE BLD GHB EST-MCNC: 174 MG/DL
HBA1C MFR BLD: 7.7 %
RIBOSOMAL P AB SER-ACNC: <0.2 AI

## 2024-03-04 ENCOUNTER — APPOINTMENT (OUTPATIENT)
Dept: SLEEP MEDICINE | Facility: HOSPITAL | Age: 59
End: 2024-03-04
Payer: COMMERCIAL

## 2024-03-12 DIAGNOSIS — Z79.4 TYPE 2 DIABETES MELLITUS WITH HYPERGLYCEMIA, WITH LONG-TERM CURRENT USE OF INSULIN (MULTI): ICD-10-CM

## 2024-03-12 DIAGNOSIS — E11.65 TYPE 2 DIABETES MELLITUS WITH HYPERGLYCEMIA, WITH LONG-TERM CURRENT USE OF INSULIN (MULTI): ICD-10-CM

## 2024-03-12 RX ORDER — INSULIN LISPRO 100 [IU]/ML
INJECTION, SOLUTION INTRAVENOUS; SUBCUTANEOUS
Qty: 30 ML | Refills: 3 | Status: SHIPPED | OUTPATIENT
Start: 2024-03-12

## 2024-03-12 RX ORDER — INSULIN DEGLUDEC 100 U/ML
INJECTION, SOLUTION SUBCUTANEOUS
Qty: 15 ML | Refills: 3 | Status: SHIPPED | OUTPATIENT
Start: 2024-03-12 | End: 2024-04-18 | Stop reason: ALTCHOICE

## 2024-04-02 ENCOUNTER — PROCEDURE VISIT (OUTPATIENT)
Dept: SLEEP MEDICINE | Facility: CLINIC | Age: 59
End: 2024-04-02
Payer: COMMERCIAL

## 2024-04-02 DIAGNOSIS — R53.83 FATIGUE, UNSPECIFIED TYPE: ICD-10-CM

## 2024-04-02 DIAGNOSIS — R40.0 DAYTIME SLEEPINESS: ICD-10-CM

## 2024-04-02 DIAGNOSIS — G47.33 OBSTRUCTIVE SLEEP APNEA (ADULT) (PEDIATRIC): ICD-10-CM

## 2024-04-02 PROCEDURE — 95810 POLYSOM 6/> YRS 4/> PARAM: CPT | Performed by: INTERNAL MEDICINE

## 2024-04-03 VITALS
HEIGHT: 67 IN | BODY MASS INDEX: 31.49 KG/M2 | WEIGHT: 200.62 LBS | DIASTOLIC BLOOD PRESSURE: 97 MMHG | SYSTOLIC BLOOD PRESSURE: 171 MMHG

## 2024-04-03 ASSESSMENT — PAIN SCALES - GENERAL: PAINLEVEL: 0-NO PAIN

## 2024-04-03 NOTE — PROGRESS NOTES
RUST TECH NOTE:     Patient: Fay Brandt   MRN//AGE: [id number]  1965  58 y.o.   Technologist: SHAGUFTA QUIROZ   Room: 6   Service Date: 4/3/2024        Sleep Testing Location: [facility]     [place]: 5, 36    TECHNOLOGIST SLEEP STUDY PROCEDURE NOTE:   This sleep study is being conducted according to the policies and procedures outlined by the AAS accreditation standards.  The sleep study procedure and processes involved during this appointment was explained to the patient/patient’s family, questions were answered. The patient/family verbalized understanding.      The patient is a 58 y.o. year old female scheduled for aDiagnostic PSG Split night with montage of: PSG she arrived for her appointment.      The study that was ultimately completed was aDiagnostic PSG Split night with montage of: PSG    The full study Was completed.  Patient questionnaires completed?: yes     Consents signed? yes    Initial Fall Risk Screening:     Fay has not fallen in the last 6 months. her did not result in injury. Fay does not have a fear of falling. He does not need assistance with sitting, standing, or walking. she does not need assistance walking in her home. she does not need assistance in an unfamiliar setting. The patient is notusing an assistive device.     Brief Study observations: n/a    Deviation to order/protocol and reason: n/a     If PAP, which was preferred mask/pressure/mode: n/a    Other:None    After the procedure, the patient/family was informed to ensure followup with ordering clinician for testing results.      Technologist: SHAGUFTA QUIROZ

## 2024-04-08 ENCOUNTER — APPOINTMENT (OUTPATIENT)
Dept: SLEEP MEDICINE | Facility: HOSPITAL | Age: 59
End: 2024-04-08
Payer: COMMERCIAL

## 2024-04-11 ENCOUNTER — TELEPHONE (OUTPATIENT)
Dept: SLEEP MEDICINE | Facility: CLINIC | Age: 59
End: 2024-04-11

## 2024-04-11 ENCOUNTER — OFFICE VISIT (OUTPATIENT)
Dept: SLEEP MEDICINE | Facility: CLINIC | Age: 59
End: 2024-04-11
Payer: COMMERCIAL

## 2024-04-11 RX ORDER — DICLOFENAC SODIUM 10 MG/G
GEL TOPICAL
COMMUNITY
Start: 2021-09-23

## 2024-04-11 ASSESSMENT — PATIENT HEALTH QUESTIONNAIRE - PHQ9
SUM OF ALL RESPONSES TO PHQ9 QUESTIONS 1 AND 2: 0
1. LITTLE INTEREST OR PLEASURE IN DOING THINGS: NOT AT ALL
2. FEELING DOWN, DEPRESSED OR HOPELESS: NOT AT ALL

## 2024-04-11 NOTE — TELEPHONE ENCOUNTER
Patient was scheduled with me for 9 AM telemedicine consult.  She was on the phone with our medical assistant just prior to 9 AM.  She never connected by video.  I sent her a new link via text message at 9:03 AM.  She did not connect.  I called her telephone and there was no answer.  I left her a message asking her to connect soon otherwise we may have to reschedule.  I provided her with our telephone number.  Our medical assistant called her, as well, with no answer.    We will try later to reschedule her.
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
## 2024-04-17 ENCOUNTER — OFFICE VISIT (OUTPATIENT)
Dept: SLEEP MEDICINE | Facility: CLINIC | Age: 59
End: 2024-04-17
Payer: COMMERCIAL

## 2024-04-17 DIAGNOSIS — G47.20 DISRUPTED SLEEP-WAKE CYCLE: ICD-10-CM

## 2024-04-17 DIAGNOSIS — E66.09 CLASS 1 OBESITY DUE TO EXCESS CALORIES WITH BODY MASS INDEX (BMI) OF 31.0 TO 31.9 IN ADULT, UNSPECIFIED WHETHER SERIOUS COMORBIDITY PRESENT: ICD-10-CM

## 2024-04-17 DIAGNOSIS — F51.8 DISRUPTED SLEEP-WAKE CYCLE: ICD-10-CM

## 2024-04-17 DIAGNOSIS — G47.33 OSA (OBSTRUCTIVE SLEEP APNEA): Primary | ICD-10-CM

## 2024-04-17 PROCEDURE — 4010F ACE/ARB THERAPY RXD/TAKEN: CPT | Performed by: PSYCHIATRY & NEUROLOGY

## 2024-04-17 PROCEDURE — 3051F HG A1C>EQUAL 7.0%<8.0%: CPT | Performed by: PSYCHIATRY & NEUROLOGY

## 2024-04-17 PROCEDURE — 3050F LDL-C >= 130 MG/DL: CPT | Performed by: PSYCHIATRY & NEUROLOGY

## 2024-04-17 PROCEDURE — 3008F BODY MASS INDEX DOCD: CPT | Performed by: PSYCHIATRY & NEUROLOGY

## 2024-04-17 PROCEDURE — 99204 OFFICE O/P NEW MOD 45 MIN: CPT | Performed by: PSYCHIATRY & NEUROLOGY

## 2024-04-17 ASSESSMENT — PATIENT HEALTH QUESTIONNAIRE - PHQ9
1. LITTLE INTEREST OR PLEASURE IN DOING THINGS: NOT AT ALL
SUM OF ALL RESPONSES TO PHQ9 QUESTIONS 1 AND 2: 0
2. FEELING DOWN, DEPRESSED OR HOPELESS: NOT AT ALL
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
## 2024-04-18 DIAGNOSIS — E11.65 TYPE 2 DIABETES MELLITUS WITH HYPERGLYCEMIA, WITH LONG-TERM CURRENT USE OF INSULIN (MULTI): Primary | ICD-10-CM

## 2024-04-18 DIAGNOSIS — Z79.4 TYPE 2 DIABETES MELLITUS WITH HYPERGLYCEMIA, WITH LONG-TERM CURRENT USE OF INSULIN (MULTI): Primary | ICD-10-CM

## 2024-04-18 RX ORDER — INSULIN GLARGINE 300 [IU]/ML
INJECTION, SOLUTION SUBCUTANEOUS
Qty: 15 ML | Refills: 6 | Status: SHIPPED | OUTPATIENT
Start: 2024-04-18

## 2024-04-23 ENCOUNTER — HOSPITAL ENCOUNTER (OUTPATIENT)
Dept: RADIOLOGY | Facility: CLINIC | Age: 59
Discharge: HOME | End: 2024-04-23
Payer: COMMERCIAL

## 2024-04-23 ENCOUNTER — OFFICE VISIT (OUTPATIENT)
Dept: PRIMARY CARE | Facility: CLINIC | Age: 59
End: 2024-04-23
Payer: COMMERCIAL

## 2024-04-23 VITALS
BODY MASS INDEX: 31.12 KG/M2 | TEMPERATURE: 97.8 F | RESPIRATION RATE: 12 BRPM | OXYGEN SATURATION: 98 % | SYSTOLIC BLOOD PRESSURE: 122 MMHG | HEART RATE: 88 BPM | DIASTOLIC BLOOD PRESSURE: 68 MMHG | WEIGHT: 193.6 LBS | HEIGHT: 66 IN

## 2024-04-23 DIAGNOSIS — J40 BRONCHITIS: ICD-10-CM

## 2024-04-23 DIAGNOSIS — Z12.31 ENCOUNTER FOR SCREENING MAMMOGRAM FOR BREAST CANCER: Primary | ICD-10-CM

## 2024-04-23 DIAGNOSIS — E78.5 HYPERLIPIDEMIA, UNSPECIFIED HYPERLIPIDEMIA TYPE: ICD-10-CM

## 2024-04-23 DIAGNOSIS — M25.50 MULTIPLE JOINT PAIN: ICD-10-CM

## 2024-04-23 DIAGNOSIS — E11.65 TYPE 2 DIABETES MELLITUS WITH HYPERGLYCEMIA, WITH LONG-TERM CURRENT USE OF INSULIN (MULTI): ICD-10-CM

## 2024-04-23 DIAGNOSIS — R06.09 DOE (DYSPNEA ON EXERTION): ICD-10-CM

## 2024-04-23 DIAGNOSIS — R53.83 FATIGUE, UNSPECIFIED TYPE: ICD-10-CM

## 2024-04-23 DIAGNOSIS — G47.33 OSA (OBSTRUCTIVE SLEEP APNEA): ICD-10-CM

## 2024-04-23 DIAGNOSIS — Z79.4 TYPE 2 DIABETES MELLITUS WITH HYPERGLYCEMIA, WITH LONG-TERM CURRENT USE OF INSULIN (MULTI): ICD-10-CM

## 2024-04-23 DIAGNOSIS — Z12.11 ENCOUNTER FOR SCREENING FOR MALIGNANT NEOPLASM OF COLON: ICD-10-CM

## 2024-04-23 PROBLEM — N64.4 BREAST PAIN: Status: RESOLVED | Noted: 2023-04-20 | Resolved: 2024-04-23

## 2024-04-23 PROBLEM — Z86.39 HISTORY OF DIABETES MELLITUS: Status: RESOLVED | Noted: 2024-02-13 | Resolved: 2024-04-23

## 2024-04-23 PROBLEM — R11.0 MILD NAUSEA: Status: RESOLVED | Noted: 2023-04-20 | Resolved: 2024-04-23

## 2024-04-23 PROCEDURE — 71046 X-RAY EXAM CHEST 2 VIEWS: CPT

## 2024-04-23 PROCEDURE — 1036F TOBACCO NON-USER: CPT | Performed by: FAMILY MEDICINE

## 2024-04-23 PROCEDURE — 4010F ACE/ARB THERAPY RXD/TAKEN: CPT | Performed by: FAMILY MEDICINE

## 2024-04-23 PROCEDURE — 3078F DIAST BP <80 MM HG: CPT | Performed by: FAMILY MEDICINE

## 2024-04-23 PROCEDURE — 3051F HG A1C>EQUAL 7.0%<8.0%: CPT | Performed by: FAMILY MEDICINE

## 2024-04-23 PROCEDURE — 3074F SYST BP LT 130 MM HG: CPT | Performed by: FAMILY MEDICINE

## 2024-04-23 PROCEDURE — 99214 OFFICE O/P EST MOD 30 MIN: CPT | Performed by: FAMILY MEDICINE

## 2024-04-23 PROCEDURE — 71046 X-RAY EXAM CHEST 2 VIEWS: CPT | Performed by: RADIOLOGY

## 2024-04-23 PROCEDURE — 3050F LDL-C >= 130 MG/DL: CPT | Performed by: FAMILY MEDICINE

## 2024-04-23 PROCEDURE — 3008F BODY MASS INDEX DOCD: CPT | Performed by: FAMILY MEDICINE

## 2024-04-23 RX ORDER — ACETAMINOPHEN 500 MG
1000 TABLET ORAL EVERY 6 HOURS PRN
Qty: 180 TABLET | Refills: 3 | Status: SHIPPED | OUTPATIENT
Start: 2024-04-23

## 2024-04-23 RX ORDER — BENZONATATE 200 MG/1
200 CAPSULE ORAL 3 TIMES DAILY PRN
Qty: 42 CAPSULE | Refills: 0 | Status: SHIPPED | OUTPATIENT
Start: 2024-04-23 | End: 2024-05-23

## 2024-04-23 RX ORDER — DOXYCYCLINE 100 MG/1
100 CAPSULE ORAL 2 TIMES DAILY
Qty: 14 CAPSULE | Refills: 0 | Status: SHIPPED | OUTPATIENT
Start: 2024-04-23 | End: 2024-04-30

## 2024-04-23 RX ORDER — IBUPROFEN 600 MG/1
600 TABLET ORAL EVERY 8 HOURS PRN
Qty: 180 TABLET | Refills: 3 | Status: SHIPPED | OUTPATIENT
Start: 2024-04-23 | End: 2025-04-23

## 2024-04-23 NOTE — PROGRESS NOTES
"Subjective   Patient ID: Fay Brandt is a 58 y.o. female who presents for Follow-up (Follow up-discuss test results Pt has cough).    HPI     Cough started 3 wks ago- started after URI (no fever,+ body aches, fatigue, cough)  Feeling like the cough is unchanged - Melissa with laying down- awakens at night  OTC Mucinex did not help-= unsure the dose- did not help  No facial pain    QUANG +  All other rhuem work up neg  Chronic joint pain  Motrin in the past helped    DIABETES A1C 7.7%,   Followed by endo  Using inulin(toujeo and humalog), jardiance 10mg    ,   States was not using the statin ( atorvastatin 80mg daily as directed)  ASCVD risk- 32.4%  Now using the atorvastatin daily X 2 wks    BP was 171/97 while at sleep study. Today at goal     TONI  Has been with fatigue  New dx of toni  Followed by sleep team  Ordered CPAP    Review of Systems  All systems reviewed and neg if not noted in the HPI above       Objective   /68 (Patient Position: Sitting)   Pulse 88   Temp 36.6 °C (97.8 °F)   Resp 12   Ht 1.676 m (5' 6\")   Wt 87.8 kg (193 lb 9.6 oz)   SpO2 98%   BMI 31.25 kg/m²     Physical Exam  Pleasant  Eyes: conjunctiva non-icteric and eye lids are without obvious rash or drooping. Pupils are symmetric.   Ears, Nose, Mouth, and Throat: External ears and nose appear to be without deformity or rash. No lesions or masses noted. Hearing is grossly intact.   CV: RRR, no murmur  Carotids: no bruits  Pulm:CTA B/L  Abd: soft, NTTP, + BS  LE: no edema  Psychiatric: Alert, orientation to person, place, and time. Recent/remote memory as evidenced through face-to-face interaction and discussion appear grossly intact. Mood and affect are normal.     Assessment/Plan   Problem List Items Addressed This Visit             ICD-10-CM    Diabetes mellitus, type 2 (Multi)  - bs are doing better  - continue to monitor E11.9    Relevant Orders    Follow Up In [facility] - Pharmacy    Hyperlipidemia  , "   States was not using the statin ( atorvastatin 80mg daily as directed)  ASCVD risk- 32.4%  Now using the atorvastatin daily X 2 wks E78.5    Relevant Orders    Follow Up In [facility] - Pharmacy    Multiple joint pain  - NSAIs and tylenol as needed  Topical creams- Voltaren gel, Salonpas, Icy hot, Bio freeze, Capsaicin, Asper cream, or Tiger balm (these are all over the counter)   - let me knwo if not better after using CPAP  - can consider rheum vs PT M25.50    Relevant Medications    acetaminophen (Tylenol Extra Strength) 500 mg tablet    ibuprofen 600 mg tablet    TONI (obstructive sleep apnea) G47.33   - call sleep med for follow up ion when the CPAP will be delivered     Other Visit Diagnoses         Codes    Encounter for screening mammogram for breast cancer    -  Primary Z12.31    Encounter for screening for malignant neoplasm of colon     Z12.11    Relevant Orders    BI mammo bilateral screening tomosynthesis    Colonoscopy Screening; High Risk Patient    Bronchitis      - doxy twice daily X 7 days  - chest xray already ordered- can get today\  - tessalon 3 X per day as needed  - let me know if not better  - rest J40    Relevant Medications    benzonatate (Tessalon) 200 mg capsule    doxycycline (Vibramycin) 100 mg capsule              Please follow up in 6months for wellness  or as needed.

## 2024-04-23 NOTE — PATIENT INSTRUCTIONS
Be on the look out for a call from the [facility]    DM  - bs are doing better  - continue to monitor      Bronchitis  - doxy twice daily X 7 days  - chest xray already ordered- can get today\  - tessalon 3 X per day as needed  - let me know if not better  - rest    Ordered mammogram  Ordered colonoscopy      Joint pain  - NSAIs and tylenol as needed  Topical creams- Voltaren gel, Salonpas, Icy hot, Bio freeze, Capsaicin, Asper cream, or Tiger balm (these are all over the counter)   - let me knwo if not better after using CPAP  - can consider rheum vs PT        Please follow up in 6months for wellness  or as needed.       ** If labs or imaging ordered at today's visit, all the non-urgent results will be discussed at your next visit    If you have been referred for a special test or to a specialist please call  [phone] to schedule an appointment.  If you have any further questions, or if develop new or worsened symptoms, please give our office a call at [phone].

## 2024-06-04 ENCOUNTER — APPOINTMENT (OUTPATIENT)
Dept: ENDOCRINOLOGY | Facility: CLINIC | Age: 59
End: 2024-06-04
Payer: COMMERCIAL

## 2024-06-07 ENCOUNTER — TELEMEDICINE (OUTPATIENT)
Dept: PHARMACY | Facility: HOSPITAL | Age: 59
End: 2024-06-07
Payer: COMMERCIAL

## 2024-06-07 DIAGNOSIS — E11.65 TYPE 2 DIABETES MELLITUS WITH HYPERGLYCEMIA, WITH LONG-TERM CURRENT USE OF INSULIN (MULTI): ICD-10-CM

## 2024-06-07 DIAGNOSIS — Z79.4 TYPE 2 DIABETES MELLITUS WITH HYPERGLYCEMIA, WITH LONG-TERM CURRENT USE OF INSULIN (MULTI): ICD-10-CM

## 2024-06-07 DIAGNOSIS — E78.5 HYPERLIPIDEMIA, UNSPECIFIED HYPERLIPIDEMIA TYPE: ICD-10-CM

## 2024-06-07 RX ORDER — BLOOD SUGAR DIAGNOSTIC
STRIP MISCELLANEOUS
Qty: 100 EACH | Refills: 3 | Status: SHIPPED | OUTPATIENT
Start: 2024-06-07

## 2024-06-07 NOTE — ASSESSMENT & PLAN NOTE
Continue   Jardiance 10 mg - 1 tablet daily   Toujeo Max U-300 - 65 units nightly (~11 PM)   Humalog 100 units/mL - 15 units plus SS    Consider decrease in Humalog dose given frequent pre-meal lows- patient states she sometimes takes the 7 units if BG <100 prior to meals but often she takes the 15 units regardless. Patient did not want to decrease dose today, encouraged to only take the 7 units if BG <100 mg/dL prior to meals     Discussed potentially increasing Jardiance to 25 mg daily to help decrease insulin burden.     Requested Rx for test strips compatible with freestyle fredy 2 reader - RX sent

## 2024-06-07 NOTE — PROGRESS NOTES
Subjective     Patient ID: Fay Brandt is a 58 y.o. female who presents for Diabetes and Hyperlipidemia.    Referring Provider: Vivien Lockwood DO     Diabetes  She presents for her initial diabetic visit. She has type 2 diabetes mellitus. Her disease course has been improving.   Hyperlipidemia  This is a chronic problem. The problem is uncontrolled. Recent lipid tests were reviewed and are high. Exacerbating diseases include diabetes. Current antihyperlipidemic treatment includes statins.       Diet: 3 meals a day -     11-1 PM first meal - light - Sausage sandwich, grits, or oatmeal     4-5 PM - second meal - cold cut sandwich (wheat bread) or hotdog     9-9:30 PM - third meal - biggest meal of the day - Air Fryer or baked chicken wings or legs    Exercise: not assessed    No Known Allergies    Objective     Current DM Pharmacotherapy:   Jardiance 10 mg - 1 tablet daily   Toujeo Max U-300 - 65 units nightly (~11 PM)   Humalog 100 units/mL - 15 units plus SS - told to take 7 units if pre meal BG is <100    SECONDARY PREVENTION  - Statin? Yes - Atorvastatin 80 mg - previously non-adherent, now adherent, no AUGUSTINA  - ACE-I/ARB? Yes  - Aspirin? No    Current monitoring regimen:   Patient is using: continuous glucose monitor    Testing frequency: CGM    SMBG Readings: Frequent lows prior to meals ranging form 50-80 mg/dL - Highest readings 120-130 mg/dL     Any episodes of hypoglycemia? Yes  Hypoglycemia awareness? No      Pertinent PMH Review:  - PMH of Pancreatitis: No  - PMH/FH of Medullary Thyroid Cancer: No  - PMH/FH of Multiple Endocrine Neoplasia (MEN) Type II: No  - PMH of Retinopathy: No  - PMH of Urinary Tract Infections/Yeast Infections: No    Lab Review  Lab Results   Component Value Date    BILITOT 0.5 02/14/2024    CALCIUM 10.0 02/14/2024    CO2 28 02/14/2024     02/14/2024    CREATININE 0.86 02/14/2024    GLUCOSE 296 (H) 02/14/2024    ALKPHOS 96 02/14/2024    K 4.4 02/14/2024    PROT 7.4 02/14/2024      02/14/2024    AST 12 02/14/2024    ALT 15 02/14/2024    BUN 11 02/14/2024    ANIONGAP 14 02/14/2024    PHOS 3.2 06/10/2022    ALBUMIN 4.5 02/14/2024    AMYLASE 38 05/20/2020    LIPASE 31 09/10/2021    GFRF 84 01/20/2023     Lab Results   Component Value Date    TRIG 153 (H) 02/14/2024    CHOL 268 (H) 02/14/2024    LDLCALC 192 (H) 02/14/2024    HDL 45.9 02/14/2024     Lab Results   Component Value Date    HGBA1C 7.7 (H) 02/14/2024     The 10-year ASCVD risk score (Tomasa RUIZ, et al., 2019) is: 12.8%    Values used to calculate the score:      Age: 58 years      Sex: Female      Is Non- : Yes      Diabetic: Yes      Tobacco smoker: No      Systolic Blood Pressure: 122 mmHg      Is BP treated: No      HDL Cholesterol: 45.9 mg/dL      Total Cholesterol: 268 mg/dL      Assessment/Plan     Problem List Items Addressed This Visit       Diabetes mellitus, type 2 (Multi)     Continue   Jardiance 10 mg - 1 tablet daily   Toujeo Max U-300 - 65 units nightly (~11 PM)   Humalog 100 units/mL - 15 units plus SS    Consider decrease in Humalog dose given frequent pre-meal lows- patient states she sometimes takes the 7 units if BG <100 prior to meals but often she takes the 15 units regardless. Patient did not want to decrease dose today, encouraged to only take the 7 units if BG <100 mg/dL prior to meals     Discussed potentially increasing Jardiance to 25 mg daily to help decrease insulin burden.     Requested Rx for test strips compatible with freestyle fredy 2 reader - RX sent          Hyperlipidemia     Continue Atorvastatin 80 mg daily - recheck in 3-6 months               Type 2 diabetes mellitus, is not at goal. Goal A1C: <7%    Follow up: I recommend diabetes care be 8-12 weeks.    Jeannine Prabhakar PharmD [facility]  Clinical Pharmacy Specialist, Primary Care     Continue all meds under the continuation of care with the referring provider and clinical pharmacy team

## 2024-06-12 ENCOUNTER — APPOINTMENT (OUTPATIENT)
Dept: SLEEP MEDICINE | Facility: CLINIC | Age: 59
End: 2024-06-12
Payer: COMMERCIAL

## 2024-06-20 ENCOUNTER — LAB (OUTPATIENT)
Dept: LAB | Facility: LAB | Age: 59
End: 2024-06-20
Payer: COMMERCIAL

## 2024-06-20 DIAGNOSIS — E11.65 TYPE 2 DIABETES MELLITUS WITH HYPERGLYCEMIA, WITH LONG-TERM CURRENT USE OF INSULIN (MULTI): Primary | ICD-10-CM

## 2024-06-20 DIAGNOSIS — E11.65 TYPE 2 DIABETES MELLITUS WITH HYPERGLYCEMIA, WITH LONG-TERM CURRENT USE OF INSULIN (MULTI): ICD-10-CM

## 2024-06-20 DIAGNOSIS — Z79.4 TYPE 2 DIABETES MELLITUS WITH HYPERGLYCEMIA, WITH LONG-TERM CURRENT USE OF INSULIN (MULTI): Primary | ICD-10-CM

## 2024-06-20 DIAGNOSIS — Z79.4 TYPE 2 DIABETES MELLITUS WITH HYPERGLYCEMIA, WITH LONG-TERM CURRENT USE OF INSULIN (MULTI): ICD-10-CM

## 2024-06-20 LAB
ALBUMIN SERPL BCP-MCNC: 4.5 G/DL (ref 3.4–5)
ANION GAP SERPL CALC-SCNC: 15 MMOL/L (ref 10–20)
BUN SERPL-MCNC: 13 MG/DL (ref 6–23)
CALCIUM SERPL-MCNC: 9.8 MG/DL (ref 8.6–10.6)
CHLORIDE SERPL-SCNC: 103 MMOL/L (ref 98–107)
CHOLEST SERPL-MCNC: 133 MG/DL (ref 0–199)
CHOLESTEROL/HDL RATIO: 3.2
CO2 SERPL-SCNC: 27 MMOL/L (ref 21–32)
CREAT SERPL-MCNC: 0.89 MG/DL (ref 0.5–1.05)
CREAT UR-MCNC: 137.4 MG/DL (ref 20–320)
EGFRCR SERPLBLD CKD-EPI 2021: 75 ML/MIN/1.73M*2
EST. AVERAGE GLUCOSE BLD GHB EST-MCNC: 131 MG/DL
GLUCOSE SERPL-MCNC: 141 MG/DL (ref 74–99)
HBA1C MFR BLD: 6.2 %
HDLC SERPL-MCNC: 41.2 MG/DL
LDLC SERPL CALC-MCNC: 74 MG/DL
MICROALBUMIN UR-MCNC: 64.6 MG/L
MICROALBUMIN/CREAT UR: 47 UG/MG CREAT
NON HDL CHOLESTEROL: 92 MG/DL (ref 0–149)
PHOSPHATE SERPL-MCNC: 3.4 MG/DL (ref 2.5–4.9)
POTASSIUM SERPL-SCNC: 3.8 MMOL/L (ref 3.5–5.3)
SODIUM SERPL-SCNC: 141 MMOL/L (ref 136–145)
TRIGL SERPL-MCNC: 88 MG/DL (ref 0–149)
VLDL: 18 MG/DL (ref 0–40)

## 2024-06-20 PROCEDURE — 83036 HEMOGLOBIN GLYCOSYLATED A1C: CPT

## 2024-06-20 PROCEDURE — 82570 ASSAY OF URINE CREATININE: CPT

## 2024-06-20 PROCEDURE — 80069 RENAL FUNCTION PANEL: CPT

## 2024-06-20 PROCEDURE — 36415 COLL VENOUS BLD VENIPUNCTURE: CPT

## 2024-06-20 PROCEDURE — 80061 LIPID PANEL: CPT

## 2024-06-20 PROCEDURE — 82043 UR ALBUMIN QUANTITATIVE: CPT

## 2024-06-25 ENCOUNTER — APPOINTMENT (OUTPATIENT)
Dept: ENDOCRINOLOGY | Facility: CLINIC | Age: 59
End: 2024-06-25
Payer: COMMERCIAL

## 2024-06-26 ENCOUNTER — APPOINTMENT (OUTPATIENT)
Dept: SLEEP MEDICINE | Facility: CLINIC | Age: 59
End: 2024-06-26
Payer: COMMERCIAL

## 2024-06-26 DIAGNOSIS — E66.09 CLASS 1 OBESITY DUE TO EXCESS CALORIES WITH BODY MASS INDEX (BMI) OF 31.0 TO 31.9 IN ADULT, UNSPECIFIED WHETHER SERIOUS COMORBIDITY PRESENT: ICD-10-CM

## 2024-06-26 DIAGNOSIS — G47.33 OSA (OBSTRUCTIVE SLEEP APNEA): Primary | ICD-10-CM

## 2024-06-26 PROCEDURE — 3008F BODY MASS INDEX DOCD: CPT | Performed by: PSYCHIATRY & NEUROLOGY

## 2024-06-26 PROCEDURE — 4010F ACE/ARB THERAPY RXD/TAKEN: CPT | Performed by: PSYCHIATRY & NEUROLOGY

## 2024-06-26 PROCEDURE — 3060F POS MICROALBUMINURIA REV: CPT | Performed by: PSYCHIATRY & NEUROLOGY

## 2024-06-26 PROCEDURE — 99213 OFFICE O/P EST LOW 20 MIN: CPT | Performed by: PSYCHIATRY & NEUROLOGY

## 2024-06-26 PROCEDURE — 3044F HG A1C LEVEL LT 7.0%: CPT | Performed by: PSYCHIATRY & NEUROLOGY

## 2024-06-26 PROCEDURE — 3048F LDL-C <100 MG/DL: CPT | Performed by: PSYCHIATRY & NEUROLOGY
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
## 2024-08-07 ENCOUNTER — APPOINTMENT (OUTPATIENT)
Dept: PHARMACY | Facility: HOSPITAL | Age: 59
End: 2024-08-07
Payer: COMMERCIAL

## 2024-08-14 ENCOUNTER — APPOINTMENT (OUTPATIENT)
Dept: SLEEP MEDICINE | Facility: CLINIC | Age: 59
End: 2024-08-14
Payer: COMMERCIAL

## 2024-08-19 ENCOUNTER — APPOINTMENT (OUTPATIENT)
Dept: GASTROENTEROLOGY | Facility: HOSPITAL | Age: 59
End: 2024-08-19
Payer: COMMERCIAL

## 2024-08-21 ENCOUNTER — APPOINTMENT (OUTPATIENT)
Dept: SLEEP MEDICINE | Facility: CLINIC | Age: 59
End: 2024-08-21
Payer: COMMERCIAL

## 2024-10-01 ENCOUNTER — APPOINTMENT (OUTPATIENT)
Dept: RADIOLOGY | Facility: CLINIC | Age: 59
End: 2024-10-01
Payer: COMMERCIAL

## 2024-10-07 DIAGNOSIS — Z79.4 TYPE 2 DIABETES MELLITUS WITH HYPERGLYCEMIA, WITH LONG-TERM CURRENT USE OF INSULIN: Primary | ICD-10-CM

## 2024-10-07 DIAGNOSIS — E11.65 TYPE 2 DIABETES MELLITUS WITH HYPERGLYCEMIA, WITH LONG-TERM CURRENT USE OF INSULIN: Primary | ICD-10-CM

## 2024-10-08 ENCOUNTER — LAB (OUTPATIENT)
Dept: LAB | Facility: LAB | Age: 59
End: 2024-10-08
Payer: COMMERCIAL

## 2024-10-08 DIAGNOSIS — Z79.4 TYPE 2 DIABETES MELLITUS WITH HYPERGLYCEMIA, WITH LONG-TERM CURRENT USE OF INSULIN: ICD-10-CM

## 2024-10-08 DIAGNOSIS — E11.65 TYPE 2 DIABETES MELLITUS WITH HYPERGLYCEMIA, WITH LONG-TERM CURRENT USE OF INSULIN: ICD-10-CM

## 2024-10-08 LAB
ALBUMIN SERPL BCP-MCNC: 4.5 G/DL (ref 3.4–5)
ANION GAP SERPL CALC-SCNC: 11 MMOL/L (ref 10–20)
BUN SERPL-MCNC: 11 MG/DL (ref 6–23)
CALCIUM SERPL-MCNC: 9.8 MG/DL (ref 8.6–10.6)
CHLORIDE SERPL-SCNC: 106 MMOL/L (ref 98–107)
CO2 SERPL-SCNC: 27 MMOL/L (ref 21–32)
CREAT SERPL-MCNC: 0.83 MG/DL (ref 0.5–1.05)
CREAT UR-MCNC: 118.2 MG/DL (ref 20–320)
EGFRCR SERPLBLD CKD-EPI 2021: 81 ML/MIN/1.73M*2
EST. AVERAGE GLUCOSE BLD GHB EST-MCNC: 126 MG/DL
GLUCOSE SERPL-MCNC: 138 MG/DL (ref 74–99)
HBA1C MFR BLD: 6 %
MICROALBUMIN UR-MCNC: 76.5 MG/L
MICROALBUMIN/CREAT UR: 64.7 UG/MG CREAT
PHOSPHATE SERPL-MCNC: 3.8 MG/DL (ref 2.5–4.9)
POTASSIUM SERPL-SCNC: 4.3 MMOL/L (ref 3.5–5.3)
SODIUM SERPL-SCNC: 140 MMOL/L (ref 136–145)

## 2024-10-08 PROCEDURE — 36415 COLL VENOUS BLD VENIPUNCTURE: CPT

## 2024-10-08 PROCEDURE — 82043 UR ALBUMIN QUANTITATIVE: CPT

## 2024-10-08 PROCEDURE — 80069 RENAL FUNCTION PANEL: CPT

## 2024-10-08 PROCEDURE — 83036 HEMOGLOBIN GLYCOSYLATED A1C: CPT

## 2024-10-08 PROCEDURE — 82570 ASSAY OF URINE CREATININE: CPT

## 2024-10-10 ENCOUNTER — PHARMACY VISIT (OUTPATIENT)
Dept: PHARMACY | Facility: CLINIC | Age: 59
End: 2024-10-10
Payer: MEDICAID

## 2024-10-10 ENCOUNTER — APPOINTMENT (OUTPATIENT)
Dept: ENDOCRINOLOGY | Facility: CLINIC | Age: 59
End: 2024-10-10
Payer: COMMERCIAL

## 2024-10-10 VITALS
BODY MASS INDEX: 31.76 KG/M2 | HEART RATE: 82 BPM | DIASTOLIC BLOOD PRESSURE: 75 MMHG | TEMPERATURE: 97.1 F | WEIGHT: 196.8 LBS | OXYGEN SATURATION: 98 % | SYSTOLIC BLOOD PRESSURE: 115 MMHG

## 2024-10-10 DIAGNOSIS — E11.65 TYPE 2 DIABETES MELLITUS WITH HYPERGLYCEMIA, WITH LONG-TERM CURRENT USE OF INSULIN: Primary | ICD-10-CM

## 2024-10-10 DIAGNOSIS — E11.649 DIABETIC HYPOGLYCEMIA (MULTI): ICD-10-CM

## 2024-10-10 DIAGNOSIS — Z79.4 TYPE 2 DIABETES MELLITUS WITH HYPERGLYCEMIA, WITH LONG-TERM CURRENT USE OF INSULIN: Primary | ICD-10-CM

## 2024-10-10 PROCEDURE — 95251 CONT GLUC MNTR ANALYSIS I&R: CPT | Performed by: STUDENT IN AN ORGANIZED HEALTH CARE EDUCATION/TRAINING PROGRAM

## 2024-10-10 PROCEDURE — RXMED WILLOW AMBULATORY MEDICATION CHARGE

## 2024-10-10 PROCEDURE — 3078F DIAST BP <80 MM HG: CPT | Performed by: STUDENT IN AN ORGANIZED HEALTH CARE EDUCATION/TRAINING PROGRAM

## 2024-10-10 PROCEDURE — 4010F ACE/ARB THERAPY RXD/TAKEN: CPT | Performed by: STUDENT IN AN ORGANIZED HEALTH CARE EDUCATION/TRAINING PROGRAM

## 2024-10-10 PROCEDURE — 3060F POS MICROALBUMINURIA REV: CPT | Performed by: STUDENT IN AN ORGANIZED HEALTH CARE EDUCATION/TRAINING PROGRAM

## 2024-10-10 PROCEDURE — 3048F LDL-C <100 MG/DL: CPT | Performed by: STUDENT IN AN ORGANIZED HEALTH CARE EDUCATION/TRAINING PROGRAM

## 2024-10-10 PROCEDURE — 3044F HG A1C LEVEL LT 7.0%: CPT | Performed by: STUDENT IN AN ORGANIZED HEALTH CARE EDUCATION/TRAINING PROGRAM

## 2024-10-10 PROCEDURE — 1036F TOBACCO NON-USER: CPT | Performed by: STUDENT IN AN ORGANIZED HEALTH CARE EDUCATION/TRAINING PROGRAM

## 2024-10-10 PROCEDURE — 99214 OFFICE O/P EST MOD 30 MIN: CPT | Performed by: STUDENT IN AN ORGANIZED HEALTH CARE EDUCATION/TRAINING PROGRAM

## 2024-10-10 PROCEDURE — 3074F SYST BP LT 130 MM HG: CPT | Performed by: STUDENT IN AN ORGANIZED HEALTH CARE EDUCATION/TRAINING PROGRAM

## 2024-10-10 RX ORDER — INSULIN GLARGINE 300 [IU]/ML
INJECTION, SOLUTION SUBCUTANEOUS
Qty: 15 ML | Refills: 6 | Status: SHIPPED | OUTPATIENT
Start: 2024-10-10

## 2024-10-10 RX ORDER — ATORVASTATIN CALCIUM 80 MG/1
80 TABLET, FILM COATED ORAL DAILY
Qty: 90 TABLET | Refills: 3 | Status: SHIPPED | OUTPATIENT
Start: 2024-10-10 | End: 2025-10-10

## 2024-10-10 RX ORDER — BLOOD-GLUCOSE SENSOR
EACH MISCELLANEOUS
Qty: 3 EACH | Refills: 11 | Status: SHIPPED | OUTPATIENT
Start: 2024-10-10

## 2024-10-10 ASSESSMENT — PAIN SCALES - GENERAL: PAINLEVEL: 0-NO PAIN

## 2024-10-10 NOTE — PATIENT INSTRUCTIONS
Download fredy 3 lucian on your phone     Change to fredy 3  Put hydrocortisone cream before you apply the patch   Continue jardiance daily   Follow the insulin instructions on your sheet    Start trulicity 0.75mg weekly     Follow up 4-5 months    Elisabet Prince MD  Divison of Endocrinology   [facility]   Phone: [phone]    option 4, then option 1  Fax: [phone]
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
## 2024-10-30 ENCOUNTER — APPOINTMENT (OUTPATIENT)
Dept: PRIMARY CARE | Facility: CLINIC | Age: 59
End: 2024-10-30

## 2024-11-25 ENCOUNTER — PHARMACY VISIT (OUTPATIENT)
Dept: PHARMACY | Facility: CLINIC | Age: 59
End: 2024-11-25
Payer: MEDICAID

## 2024-11-25 PROCEDURE — RXMED WILLOW AMBULATORY MEDICATION CHARGE

## 2024-12-18 PROCEDURE — RXMED WILLOW AMBULATORY MEDICATION CHARGE

## 2024-12-19 ENCOUNTER — PHARMACY VISIT (OUTPATIENT)
Dept: PHARMACY | Facility: CLINIC | Age: 59
End: 2024-12-19
Payer: MEDICAID

## 2025-01-17 PROCEDURE — RXMED WILLOW AMBULATORY MEDICATION CHARGE

## 2025-01-21 ENCOUNTER — APPOINTMENT (OUTPATIENT)
Dept: PRIMARY CARE | Facility: CLINIC | Age: 60
End: 2025-01-21
Payer: COMMERCIAL

## 2025-01-21 ENCOUNTER — PHARMACY VISIT (OUTPATIENT)
Dept: PHARMACY | Facility: CLINIC | Age: 60
End: 2025-01-21
Payer: MEDICAID

## 2025-01-21 DIAGNOSIS — E11.9 TYPE 2 DIABETES MELLITUS WITHOUT COMPLICATION, UNSPECIFIED WHETHER LONG TERM INSULIN USE (MULTI): Primary | ICD-10-CM

## 2025-01-21 DIAGNOSIS — Z11.59 ENCOUNTER FOR HEPATITIS C SCREENING TEST FOR LOW RISK PATIENT: ICD-10-CM

## 2025-01-21 DIAGNOSIS — E11.40 DIABETIC NEUROPATHY, PAINFUL (MULTI): ICD-10-CM

## 2025-01-21 PROCEDURE — 99213 OFFICE O/P EST LOW 20 MIN: CPT | Performed by: FAMILY MEDICINE

## 2025-01-21 PROCEDURE — 4010F ACE/ARB THERAPY RXD/TAKEN: CPT | Performed by: FAMILY MEDICINE

## 2025-01-21 NOTE — PROGRESS NOTES
Subjective   Patient ID: Fay Brandt is a 59 y.o. female who presents for Diabetes.    HPI       TONI  followed by sleep med  started on CPAP    DM  Followed by endo and pharm d team  Using meds w/o issues, BS are better  Using fredy 3  Hba1c 6.0<--6.2    Review of Systems  All systems reviewed and neg if not noted in the HPI above     Objective   There were no vitals taken for this visit.    Physical Exam  Constitutional:       Appearance: Normal appearance.   HENT:      Head: Normocephalic.   Eyes:      Extraocular Movements: Extraocular movements intact.      Conjunctiva/sclera: Conjunctivae normal.      Pupils: Pupils are equal, round, and reactive to light.   Pulmonary:      Effort: Pulmonary effort is normal.   Musculoskeletal:         General: No deformity or signs of injury.   Skin:     Coloration: Skin is not jaundiced.      Findings: No rash.   Neurological:      Mental Status: She is alert and oriented to person, place, and time. Mental status is at baseline.   Psychiatric:         Mood and Affect: Mood normal.           Assessment/Plan   Problem List Items Addressed This Visit             ICD-10-CM    Diabetes mellitus, type 2 (Multi) - Primary E11.9    Relevant Orders    Albumin-Creatinine Ratio, Urine Random    Hemoglobin A1C    Renal function panel    Diabetic neuropathy, painful (Multi) E11.40     - Stable  - Continue to monitor           Relevant Orders    Albumin-Creatinine Ratio, Urine Random    Hemoglobin A1C    Renal function panel     Other Visit Diagnoses         Codes    Encounter for hepatitis C screening test for low risk patient     Z11.59    Relevant Orders    Hepatitis C antibody

## 2025-01-23 PROCEDURE — RXMED WILLOW AMBULATORY MEDICATION CHARGE

## 2025-01-25 ENCOUNTER — PHARMACY VISIT (OUTPATIENT)
Dept: PHARMACY | Facility: CLINIC | Age: 60
End: 2025-01-25
Payer: MEDICAID

## 2025-02-14 ENCOUNTER — PHARMACY VISIT (OUTPATIENT)
Dept: PHARMACY | Facility: CLINIC | Age: 60
End: 2025-02-14
Payer: MEDICAID

## 2025-02-14 PROCEDURE — RXMED WILLOW AMBULATORY MEDICATION CHARGE

## 2025-02-21 DIAGNOSIS — E11.65 TYPE 2 DIABETES MELLITUS WITH HYPERGLYCEMIA, WITH LONG-TERM CURRENT USE OF INSULIN: ICD-10-CM

## 2025-02-21 DIAGNOSIS — Z79.4 TYPE 2 DIABETES MELLITUS WITH HYPERGLYCEMIA, WITH LONG-TERM CURRENT USE OF INSULIN: ICD-10-CM

## 2025-02-24 DIAGNOSIS — E11.65 TYPE 2 DIABETES MELLITUS WITH HYPERGLYCEMIA, WITH LONG-TERM CURRENT USE OF INSULIN: ICD-10-CM

## 2025-02-24 DIAGNOSIS — Z79.4 TYPE 2 DIABETES MELLITUS WITH HYPERGLYCEMIA, WITH LONG-TERM CURRENT USE OF INSULIN: ICD-10-CM

## 2025-02-24 RX ORDER — LISINOPRIL 5 MG/1
5 TABLET ORAL DAILY
Qty: 90 TABLET | Refills: 3 | Status: SHIPPED | OUTPATIENT
Start: 2025-02-24

## 2025-02-25 RX ORDER — PEN NEEDLE, DIABETIC 31 GX5/16"
NEEDLE, DISPOSABLE MISCELLANEOUS
Qty: 400 EACH | Refills: 1 | Status: SHIPPED | OUTPATIENT
Start: 2025-02-25

## 2025-02-25 RX ORDER — LISINOPRIL 5 MG/1
5 TABLET ORAL DAILY
Qty: 90 TABLET | Refills: 3 | OUTPATIENT
Start: 2025-02-25

## 2025-03-19 ENCOUNTER — LAB (OUTPATIENT)
Dept: LAB | Facility: HOSPITAL | Age: 60
End: 2025-03-19
Payer: COMMERCIAL

## 2025-03-19 PROCEDURE — RXMED WILLOW AMBULATORY MEDICATION CHARGE

## 2025-03-20 ENCOUNTER — APPOINTMENT (OUTPATIENT)
Dept: ENDOCRINOLOGY | Facility: CLINIC | Age: 60
End: 2025-03-20
Payer: COMMERCIAL

## 2025-03-20 ENCOUNTER — PHARMACY VISIT (OUTPATIENT)
Dept: PHARMACY | Facility: CLINIC | Age: 60
End: 2025-03-20
Payer: MEDICAID

## 2025-03-20 VITALS
WEIGHT: 190 LBS | HEART RATE: 84 BPM | TEMPERATURE: 97.1 F | OXYGEN SATURATION: 96 % | BODY MASS INDEX: 30.67 KG/M2 | SYSTOLIC BLOOD PRESSURE: 122 MMHG | DIASTOLIC BLOOD PRESSURE: 75 MMHG

## 2025-03-20 DIAGNOSIS — E55.9 VITAMIN D DEFICIENCY: ICD-10-CM

## 2025-03-20 DIAGNOSIS — Z79.4 TYPE 2 DIABETES MELLITUS WITH HYPERGLYCEMIA, WITH LONG-TERM CURRENT USE OF INSULIN: Primary | ICD-10-CM

## 2025-03-20 DIAGNOSIS — E11.649 DIABETIC HYPOGLYCEMIA (MULTI): ICD-10-CM

## 2025-03-20 DIAGNOSIS — E11.65 TYPE 2 DIABETES MELLITUS WITH HYPERGLYCEMIA, WITH LONG-TERM CURRENT USE OF INSULIN: Primary | ICD-10-CM

## 2025-03-20 LAB
ALBUMIN SERPL-MCNC: 4.7 G/DL (ref 3.6–5.1)
ALBUMIN/CREAT UR: 19 MG/G CREAT
BUN SERPL-MCNC: 15 MG/DL (ref 7–25)
BUN/CREAT SERPL: 12 (CALC) (ref 6–22)
CALCIUM SERPL-MCNC: 9.4 MG/DL (ref 8.6–10.4)
CHLORIDE SERPL-SCNC: 103 MMOL/L (ref 98–110)
CO2 SERPL-SCNC: 28 MMOL/L (ref 20–32)
CREAT SERPL-MCNC: 1.29 MG/DL (ref 0.5–1.03)
CREAT UR-MCNC: 202 MG/DL (ref 20–275)
EGFRCR SERPLBLD CKD-EPI 2021: 48 ML/MIN/1.73M2
EST. AVERAGE GLUCOSE BLD GHB EST-MCNC: 105 MG/DL
EST. AVERAGE GLUCOSE BLD GHB EST-SCNC: 5.8 MMOL/L
GLUCOSE SERPL-MCNC: 98 MG/DL (ref 65–139)
HBA1C MFR BLD: 5.3 % OF TOTAL HGB
HCV AB SERPL QL IA: NORMAL
MICROALBUMIN UR-MCNC: 3.8 MG/DL
PHOSPHATE SERPL-MCNC: 3.6 MG/DL (ref 2.5–4.5)
POTASSIUM SERPL-SCNC: 4 MMOL/L (ref 3.5–5.3)
SODIUM SERPL-SCNC: 140 MMOL/L (ref 135–146)

## 2025-03-20 PROCEDURE — 3074F SYST BP LT 130 MM HG: CPT | Performed by: STUDENT IN AN ORGANIZED HEALTH CARE EDUCATION/TRAINING PROGRAM

## 2025-03-20 PROCEDURE — 3078F DIAST BP <80 MM HG: CPT | Performed by: STUDENT IN AN ORGANIZED HEALTH CARE EDUCATION/TRAINING PROGRAM

## 2025-03-20 PROCEDURE — 95251 CONT GLUC MNTR ANALYSIS I&R: CPT | Performed by: STUDENT IN AN ORGANIZED HEALTH CARE EDUCATION/TRAINING PROGRAM

## 2025-03-20 PROCEDURE — 4010F ACE/ARB THERAPY RXD/TAKEN: CPT | Performed by: STUDENT IN AN ORGANIZED HEALTH CARE EDUCATION/TRAINING PROGRAM

## 2025-03-20 PROCEDURE — 99214 OFFICE O/P EST MOD 30 MIN: CPT | Performed by: STUDENT IN AN ORGANIZED HEALTH CARE EDUCATION/TRAINING PROGRAM

## 2025-03-20 PROCEDURE — RXMED WILLOW AMBULATORY MEDICATION CHARGE

## 2025-03-20 RX ORDER — BLOOD-GLUCOSE SENSOR
EACH MISCELLANEOUS
Qty: 2 EACH | Refills: 11 | Status: SHIPPED | OUTPATIENT
Start: 2025-03-20

## 2025-03-20 RX ORDER — ACETAMINOPHEN 500 MG
5000 TABLET ORAL DAILY
Qty: 90 TABLET | Refills: 3 | Status: SHIPPED | OUTPATIENT
Start: 2025-03-20

## 2025-03-20 RX ORDER — LISINOPRIL 5 MG/1
5 TABLET ORAL DAILY
Qty: 90 TABLET | Refills: 3 | Status: SHIPPED | OUTPATIENT
Start: 2025-03-20

## 2025-03-20 RX ORDER — PEN NEEDLE, DIABETIC 31 GX5/16"
NEEDLE, DISPOSABLE MISCELLANEOUS
Qty: 400 EACH | Refills: 1 | Status: SHIPPED | OUTPATIENT
Start: 2025-03-20

## 2025-03-20 NOTE — PATIENT INSTRUCTIONS
Continue jardiance  Trulicity 1.5mg weekly   Follow the insulin instructions on your sheet     Vitamin D 5000 units daily    Elisabet Prince MD  Divison of Endocrinology   [facility]   Phone: [phone]    option 4, then option 1  Fax: [phone]
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
## 2025-04-24 PROCEDURE — RXMED WILLOW AMBULATORY MEDICATION CHARGE

## 2025-05-07 ENCOUNTER — PHARMACY VISIT (OUTPATIENT)
Dept: PHARMACY | Facility: CLINIC | Age: 60
End: 2025-05-07
Payer: MEDICAID

## 2025-05-07 ENCOUNTER — TELEPHONE (OUTPATIENT)
Dept: ENDOCRINOLOGY | Facility: CLINIC | Age: 60
End: 2025-05-07

## 2025-05-07 DIAGNOSIS — E11.65 TYPE 2 DIABETES MELLITUS WITH HYPERGLYCEMIA, WITH LONG-TERM CURRENT USE OF INSULIN: ICD-10-CM

## 2025-05-07 DIAGNOSIS — Z79.4 TYPE 2 DIABETES MELLITUS WITH HYPERGLYCEMIA, WITH LONG-TERM CURRENT USE OF INSULIN: ICD-10-CM

## 2025-05-07 RX ORDER — INSULIN GLARGINE 300 [IU]/ML
INJECTION, SOLUTION SUBCUTANEOUS
Qty: 15 ML | Refills: 6 | Status: SHIPPED | OUTPATIENT
Start: 2025-05-07

## 2025-05-08 ENCOUNTER — TELEPHONE (OUTPATIENT)
Dept: ENDOCRINOLOGY | Facility: CLINIC | Age: 60
End: 2025-05-08

## 2025-05-29 ENCOUNTER — APPOINTMENT (OUTPATIENT)
Dept: PRIMARY CARE | Facility: CLINIC | Age: 60
End: 2025-05-29
Payer: COMMERCIAL

## 2025-06-18 PROCEDURE — RXMED WILLOW AMBULATORY MEDICATION CHARGE

## 2025-06-21 ENCOUNTER — PHARMACY VISIT (OUTPATIENT)
Dept: PHARMACY | Facility: CLINIC | Age: 60
End: 2025-06-21
Payer: MEDICAID

## 2025-07-01 DIAGNOSIS — Z79.4 TYPE 2 DIABETES MELLITUS WITH HYPERGLYCEMIA, WITH LONG-TERM CURRENT USE OF INSULIN: ICD-10-CM

## 2025-07-01 DIAGNOSIS — E11.65 TYPE 2 DIABETES MELLITUS WITH HYPERGLYCEMIA, WITH LONG-TERM CURRENT USE OF INSULIN: ICD-10-CM

## 2025-07-05 DIAGNOSIS — Z79.4 TYPE 2 DIABETES MELLITUS WITH HYPERGLYCEMIA, WITH LONG-TERM CURRENT USE OF INSULIN: ICD-10-CM

## 2025-07-05 DIAGNOSIS — E11.65 TYPE 2 DIABETES MELLITUS WITH HYPERGLYCEMIA, WITH LONG-TERM CURRENT USE OF INSULIN: ICD-10-CM

## 2025-07-05 RX ORDER — DULAGLUTIDE 0.75 MG/.5ML
0.75 INJECTION, SOLUTION SUBCUTANEOUS
Qty: 2 ML | Refills: 11 | Status: SHIPPED | OUTPATIENT
Start: 2025-07-06

## 2025-07-05 RX ORDER — INSULIN GLARGINE 300 [IU]/ML
INJECTION, SOLUTION SUBCUTANEOUS
Qty: 15 ML | Refills: 6 | Status: SHIPPED | OUTPATIENT
Start: 2025-07-05

## 2025-07-10 RX ORDER — INSULIN GLARGINE 300 [IU]/ML
INJECTION, SOLUTION SUBCUTANEOUS
Qty: 15 ML | Refills: 6 | Status: SHIPPED | OUTPATIENT
Start: 2025-07-10

## 2025-07-22 PROCEDURE — RXMED WILLOW AMBULATORY MEDICATION CHARGE

## 2025-07-23 ENCOUNTER — PHARMACY VISIT (OUTPATIENT)
Dept: PHARMACY | Facility: CLINIC | Age: 60
End: 2025-07-23
Payer: MEDICAID

## 2025-07-23 DIAGNOSIS — Z12.31 ENCOUNTER FOR SCREENING MAMMOGRAM FOR BREAST CANCER: ICD-10-CM

## 2025-08-08 ENCOUNTER — APPOINTMENT (OUTPATIENT)
Dept: RADIOLOGY | Facility: CLINIC | Age: 60
End: 2025-08-08
Payer: COMMERCIAL

## 2025-08-18 ENCOUNTER — APPOINTMENT (OUTPATIENT)
Dept: PRIMARY CARE | Facility: CLINIC | Age: 60
End: 2025-08-18
Payer: COMMERCIAL

## 2025-08-18 ENCOUNTER — HOSPITAL ENCOUNTER (OUTPATIENT)
Dept: RADIOLOGY | Facility: CLINIC | Age: 60
Discharge: HOME | End: 2025-08-18
Payer: COMMERCIAL

## 2025-08-18 VITALS
OXYGEN SATURATION: 98 % | DIASTOLIC BLOOD PRESSURE: 70 MMHG | HEIGHT: 66 IN | TEMPERATURE: 97 F | WEIGHT: 184 LBS | SYSTOLIC BLOOD PRESSURE: 100 MMHG | HEART RATE: 78 BPM | BODY MASS INDEX: 29.57 KG/M2 | RESPIRATION RATE: 12 BRPM

## 2025-08-18 DIAGNOSIS — M25.50 MULTIPLE JOINT PAIN: ICD-10-CM

## 2025-08-18 DIAGNOSIS — M19.011 PRIMARY OSTEOARTHRITIS OF BOTH SHOULDERS: ICD-10-CM

## 2025-08-18 DIAGNOSIS — M19.012 PRIMARY OSTEOARTHRITIS OF BOTH SHOULDERS: ICD-10-CM

## 2025-08-18 DIAGNOSIS — Z79.4 TYPE 2 DIABETES MELLITUS WITH HYPERGLYCEMIA, WITH LONG-TERM CURRENT USE OF INSULIN: ICD-10-CM

## 2025-08-18 DIAGNOSIS — Z00.00 WELLNESS EXAMINATION: Primary | ICD-10-CM

## 2025-08-18 DIAGNOSIS — F33.1 DEPRESSION, MAJOR, RECURRENT, MODERATE: ICD-10-CM

## 2025-08-18 DIAGNOSIS — E11.65 TYPE 2 DIABETES MELLITUS WITH HYPERGLYCEMIA, WITH LONG-TERM CURRENT USE OF INSULIN: ICD-10-CM

## 2025-08-18 DIAGNOSIS — Z12.31 SCREENING MAMMOGRAM FOR BREAST CANCER: ICD-10-CM

## 2025-08-18 DIAGNOSIS — R53.83 FATIGUE, UNSPECIFIED TYPE: ICD-10-CM

## 2025-08-18 DIAGNOSIS — E78.5 HYPERLIPIDEMIA, UNSPECIFIED HYPERLIPIDEMIA TYPE: ICD-10-CM

## 2025-08-18 PROCEDURE — 73030 X-RAY EXAM OF SHOULDER: CPT | Mod: BILATERAL PROCEDURE | Performed by: RADIOLOGY

## 2025-08-18 PROCEDURE — 3008F BODY MASS INDEX DOCD: CPT | Performed by: FAMILY MEDICINE

## 2025-08-18 PROCEDURE — 3078F DIAST BP <80 MM HG: CPT | Performed by: FAMILY MEDICINE

## 2025-08-18 PROCEDURE — 4010F ACE/ARB THERAPY RXD/TAKEN: CPT | Performed by: FAMILY MEDICINE

## 2025-08-18 PROCEDURE — 1036F TOBACCO NON-USER: CPT | Performed by: FAMILY MEDICINE

## 2025-08-18 PROCEDURE — 99396 PREV VISIT EST AGE 40-64: CPT | Performed by: FAMILY MEDICINE

## 2025-08-18 PROCEDURE — 3074F SYST BP LT 130 MM HG: CPT | Performed by: FAMILY MEDICINE

## 2025-08-18 PROCEDURE — 73030 X-RAY EXAM OF SHOULDER: CPT | Mod: 50

## 2025-08-18 PROCEDURE — 99215 OFFICE O/P EST HI 40 MIN: CPT | Performed by: FAMILY MEDICINE

## 2025-08-18 PROCEDURE — RXMED WILLOW AMBULATORY MEDICATION CHARGE

## 2025-08-18 RX ORDER — ACETAMINOPHEN 500 MG
1000 TABLET ORAL EVERY 6 HOURS PRN
Qty: 180 TABLET | Refills: 3 | Status: SHIPPED | OUTPATIENT
Start: 2025-08-18

## 2025-08-18 RX ORDER — INSULIN GLARGINE 300 [IU]/ML
INJECTION, SOLUTION SUBCUTANEOUS
Qty: 12 ML | Refills: 0 | Status: SHIPPED | OUTPATIENT
Start: 2025-08-18 | End: 2025-08-21 | Stop reason: SDUPTHER

## 2025-08-18 ASSESSMENT — PATIENT HEALTH QUESTIONNAIRE - PHQ9
1. LITTLE INTEREST OR PLEASURE IN DOING THINGS: SEVERAL DAYS
9. THOUGHTS THAT YOU WOULD BE BETTER OFF DEAD, OR OF HURTING YOURSELF: NOT AT ALL
2. FEELING DOWN, DEPRESSED OR HOPELESS: SEVERAL DAYS
SUM OF ALL RESPONSES TO PHQ9 QUESTIONS 1 AND 2: 0
SUM OF ALL RESPONSES TO PHQ9 QUESTIONS 1 AND 2: 2
5. POOR APPETITE OR OVEREATING: SEVERAL DAYS
1. LITTLE INTEREST OR PLEASURE IN DOING THINGS: NOT AT ALL
3. TROUBLE FALLING OR STAYING ASLEEP OR SLEEPING TOO MUCH: NEARLY EVERY DAY
4. FEELING TIRED OR HAVING LITTLE ENERGY: NEARLY EVERY DAY
SUM OF ALL RESPONSES TO PHQ QUESTIONS 1-9: 9
8. MOVING OR SPEAKING SO SLOWLY THAT OTHER PEOPLE COULD HAVE NOTICED. OR THE OPPOSITE, BEING SO FIGETY OR RESTLESS THAT YOU HAVE BEEN MOVING AROUND A LOT MORE THAN USUAL: NOT AT ALL
6. FEELING BAD ABOUT YOURSELF - OR THAT YOU ARE A FAILURE OR HAVE LET YOURSELF OR YOUR FAMILY DOWN: NOT AT ALL
2. FEELING DOWN, DEPRESSED OR HOPELESS: NOT AT ALL
7. TROUBLE CONCENTRATING ON THINGS, SUCH AS READING THE NEWSPAPER OR WATCHING TELEVISION: NOT AT ALL

## 2025-08-18 ASSESSMENT — PROMIS GLOBAL HEALTH SCALE
CARRYOUT_PHYSICAL_ACTIVITIES: MOSTLY
RATE_SOCIAL_SATISFACTION: GOOD
RATE_MENTAL_HEALTH: GOOD
RATE_AVERAGE_FATIGUE: VERY SEVERE
RATE_QUALITY_OF_LIFE: FAIR
RATE_GENERAL_HEALTH: FAIR
CARRYOUT_SOCIAL_ACTIVITIES: GOOD
EMOTIONAL_PROBLEMS: SOMETIMES
RATE_AVERAGE_PAIN: 7
RATE_PHYSICAL_HEALTH: FAIR

## 2025-08-19 LAB
ALBUMIN SERPL-MCNC: 4.9 G/DL (ref 3.6–5.1)
BASOPHILS # BLD AUTO: 37 CELLS/UL (ref 0–200)
BASOPHILS NFR BLD AUTO: 0.5 %
BUN SERPL-MCNC: 10 MG/DL (ref 7–25)
BUN/CREAT SERPL: ABNORMAL (CALC) (ref 6–22)
CALCIUM SERPL-MCNC: 9.9 MG/DL (ref 8.6–10.4)
CHLORIDE SERPL-SCNC: 104 MMOL/L (ref 98–110)
CHOLEST SERPL-MCNC: 131 MG/DL
CHOLEST/HDLC SERPL: 2.8 (CALC)
CO2 SERPL-SCNC: 26 MMOL/L (ref 20–32)
CREAT SERPL-MCNC: 0.85 MG/DL (ref 0.5–1.03)
EGFRCR SERPLBLD CKD-EPI 2021: 79 ML/MIN/1.73M2
EOSINOPHIL # BLD AUTO: 102 CELLS/UL (ref 15–500)
EOSINOPHIL NFR BLD AUTO: 1.4 %
ERYTHROCYTE [DISTWIDTH] IN BLOOD BY AUTOMATED COUNT: 12.2 % (ref 11–15)
EST. AVERAGE GLUCOSE BLD GHB EST-MCNC: 131 MG/DL
EST. AVERAGE GLUCOSE BLD GHB EST-SCNC: 7.3 MMOL/L
GLUCOSE SERPL-MCNC: 152 MG/DL (ref 65–99)
HBA1C MFR BLD: 6.2 %
HCT VFR BLD AUTO: 39.6 % (ref 35–45)
HDLC SERPL-MCNC: 46 MG/DL
HGB BLD-MCNC: 13.4 G/DL (ref 11.7–15.5)
LDLC SERPL CALC-MCNC: 68 MG/DL (CALC)
LYMPHOCYTES # BLD AUTO: 1927 CELLS/UL (ref 850–3900)
LYMPHOCYTES NFR BLD AUTO: 26.4 %
MCH RBC QN AUTO: 30.9 PG (ref 27–33)
MCHC RBC AUTO-ENTMCNC: 33.8 G/DL (ref 32–36)
MCV RBC AUTO: 91.2 FL (ref 80–100)
MONOCYTES # BLD AUTO: 270 CELLS/UL (ref 200–950)
MONOCYTES NFR BLD AUTO: 3.7 %
NEUTROPHILS # BLD AUTO: 4964 CELLS/UL (ref 1500–7800)
NEUTROPHILS NFR BLD AUTO: 68 %
NONHDLC SERPL-MCNC: 85 MG/DL (CALC)
PHOSPHATE SERPL-MCNC: 3.5 MG/DL (ref 2.5–4.5)
PLATELET # BLD AUTO: 276 THOUSAND/UL (ref 140–400)
PMV BLD REES-ECKER: 9.8 FL (ref 7.5–12.5)
POTASSIUM SERPL-SCNC: 4.4 MMOL/L (ref 3.5–5.3)
RBC # BLD AUTO: 4.34 MILLION/UL (ref 3.8–5.1)
SODIUM SERPL-SCNC: 141 MMOL/L (ref 135–146)
TRIGL SERPL-MCNC: 89 MG/DL
TSH SERPL-ACNC: 1.82 MIU/L (ref 0.4–4.5)
WBC # BLD AUTO: 7.3 THOUSAND/UL (ref 3.8–10.8)

## 2025-08-19 PROCEDURE — RXMED WILLOW AMBULATORY MEDICATION CHARGE

## 2025-08-20 ENCOUNTER — PHARMACY VISIT (OUTPATIENT)
Dept: PHARMACY | Facility: CLINIC | Age: 60
End: 2025-08-20
Payer: MEDICAID

## 2025-08-21 DIAGNOSIS — E11.65 TYPE 2 DIABETES MELLITUS WITH HYPERGLYCEMIA, WITH LONG-TERM CURRENT USE OF INSULIN: ICD-10-CM

## 2025-08-21 DIAGNOSIS — Z79.4 TYPE 2 DIABETES MELLITUS WITH HYPERGLYCEMIA, WITH LONG-TERM CURRENT USE OF INSULIN: ICD-10-CM

## 2025-08-21 RX ORDER — INSULIN GLARGINE 300 [IU]/ML
INJECTION, SOLUTION SUBCUTANEOUS
Qty: 15 ML | Refills: 3 | Status: SHIPPED | OUTPATIENT
Start: 2025-08-21

## 2025-09-16 ENCOUNTER — APPOINTMENT (OUTPATIENT)
Dept: ENDOCRINOLOGY | Facility: CLINIC | Age: 60
End: 2025-09-16
Payer: COMMERCIAL

## 2025-09-22 ENCOUNTER — APPOINTMENT (OUTPATIENT)
Dept: PRIMARY CARE | Facility: CLINIC | Age: 60
End: 2025-09-22
Payer: COMMERCIAL

## 2025-11-04 ENCOUNTER — APPOINTMENT (OUTPATIENT)
Dept: PRIMARY CARE | Facility: CLINIC | Age: 60
End: 2025-11-04
Payer: COMMERCIAL

## 2026-02-18 ENCOUNTER — APPOINTMENT (OUTPATIENT)
Dept: PRIMARY CARE | Facility: CLINIC | Age: 61
End: 2026-02-18
Payer: COMMERCIAL

## 2026-08-26 ENCOUNTER — APPOINTMENT (OUTPATIENT)
Dept: PRIMARY CARE | Facility: CLINIC | Age: 61
End: 2026-08-26
Payer: COMMERCIAL